# Patient Record
Sex: FEMALE | Race: WHITE | Employment: OTHER | URBAN - METROPOLITAN AREA
[De-identification: names, ages, dates, MRNs, and addresses within clinical notes are randomized per-mention and may not be internally consistent; named-entity substitution may affect disease eponyms.]

---

## 2017-10-06 ENCOUNTER — PATIENT OUTREACH (OUTPATIENT)
Dept: CASE MANAGEMENT | Age: 67
End: 2017-10-06

## 2017-10-06 ENCOUNTER — HOSPITAL ENCOUNTER (OUTPATIENT)
Dept: LAB | Age: 67
Discharge: HOME | End: 2017-10-06
Payer: MEDICARE

## 2017-10-06 DIAGNOSIS — C64.9 MALIGNANT NEOPLASM OF KIDNEY, UNSPECIFIED LATERALITY (HCC): ICD-10-CM

## 2017-10-06 DIAGNOSIS — R91.8 LUNG NODULES: ICD-10-CM

## 2017-10-06 LAB
ALBUMIN SERPL-MCNC: 2.7 G/DL (ref 3.2–4.6)
ALBUMIN/GLOB SERPL: 0.5 {RATIO} (ref 1.2–3.5)
ALP SERPL-CCNC: 158 U/L (ref 50–136)
ALT SERPL-CCNC: 11 U/L (ref 12–65)
ANION GAP SERPL CALC-SCNC: 12 MMOL/L (ref 7–16)
AST SERPL-CCNC: 16 U/L (ref 15–37)
BASOPHILS # BLD: 0 K/UL (ref 0–0.2)
BASOPHILS NFR BLD: 0 % (ref 0–2)
BILIRUB SERPL-MCNC: 0.4 MG/DL (ref 0.2–1.1)
BUN SERPL-MCNC: 28 MG/DL (ref 8–23)
CALCIUM SERPL-MCNC: 9.8 MG/DL (ref 8.3–10.4)
CHLORIDE SERPL-SCNC: 99 MMOL/L (ref 98–107)
CO2 SERPL-SCNC: 24 MMOL/L (ref 21–32)
CREAT SERPL-MCNC: 1.54 MG/DL (ref 0.6–1)
DIFFERENTIAL METHOD BLD: ABNORMAL
EOSINOPHIL # BLD: 0 K/UL (ref 0–0.8)
EOSINOPHIL NFR BLD: 0 % (ref 0.5–7.8)
ERYTHROCYTE [DISTWIDTH] IN BLOOD BY AUTOMATED COUNT: 18.2 % (ref 11.9–14.6)
FERRITIN SERPL-MCNC: 1450 NG/ML (ref 8–388)
GLOBULIN SER CALC-MCNC: 5.3 G/DL (ref 2.3–3.5)
GLUCOSE SERPL-MCNC: 88 MG/DL (ref 65–100)
HCT VFR BLD AUTO: 27.9 % (ref 35.8–46.3)
HGB BLD-MCNC: 8.6 G/DL (ref 11.7–15.4)
IRON SATN MFR SERPL: 15 %
IRON SERPL-MCNC: 30 UG/DL (ref 35–150)
LDH SERPL L TO P-CCNC: 193 U/L (ref 110–210)
LYMPHOCYTES # BLD: 0.9 K/UL (ref 0.5–4.6)
LYMPHOCYTES NFR BLD: 12 % (ref 13–44)
MCH RBC QN AUTO: 25.5 PG (ref 26.1–32.9)
MCHC RBC AUTO-ENTMCNC: 30.8 G/DL (ref 31.4–35)
MCV RBC AUTO: 82.8 FL (ref 79.6–97.8)
MONOCYTES # BLD: 0.5 K/UL (ref 0.1–1.3)
MONOCYTES NFR BLD: 6 % (ref 4–12)
NEUTS SEG # BLD: 5.9 K/UL (ref 1.7–8.2)
NEUTS SEG NFR BLD: 81 % (ref 43–78)
NRBC # BLD: 0 K/UL (ref 0–0.2)
PLATELET # BLD AUTO: 166 K/UL (ref 150–450)
PMV BLD AUTO: 8.5 FL (ref 10.8–14.1)
POTASSIUM SERPL-SCNC: 4.4 MMOL/L (ref 3.5–5.1)
PROT SERPL-MCNC: 8 G/DL (ref 6.3–8.2)
RBC # BLD AUTO: 3.37 M/UL (ref 4.05–5.25)
SODIUM SERPL-SCNC: 135 MMOL/L (ref 136–145)
TIBC SERPL-MCNC: 198 UG/DL (ref 250–450)
WBC # BLD AUTO: 7.2 K/UL (ref 4.3–11.1)

## 2017-10-06 PROCEDURE — 82728 ASSAY OF FERRITIN: CPT | Performed by: INTERNAL MEDICINE

## 2017-10-06 PROCEDURE — 83540 ASSAY OF IRON: CPT | Performed by: INTERNAL MEDICINE

## 2017-10-06 PROCEDURE — 80053 COMPREHEN METABOLIC PANEL: CPT | Performed by: INTERNAL MEDICINE

## 2017-10-06 PROCEDURE — 85025 COMPLETE CBC W/AUTO DIFF WBC: CPT | Performed by: INTERNAL MEDICINE

## 2017-10-06 PROCEDURE — 83615 LACTATE (LD) (LDH) ENZYME: CPT | Performed by: INTERNAL MEDICINE

## 2017-10-06 PROCEDURE — 36415 COLL VENOUS BLD VENIPUNCTURE: CPT | Performed by: INTERNAL MEDICINE

## 2017-10-10 NOTE — PROGRESS NOTES
History of Present Illness:  Ms. Anish Bowman is a 74 y.o. female who presents today for evaluation regarding metastatic renal cell carcinoma. She developed worsening back pain over the past several months, culminating in a CT scan performed in August 2017 which showed a large kidney mass, bilateral lung masses and a right adrenal mass. 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 She was seen at [facility] and recommended to start on palliative therapy with Sutent and gemcitabine. She has started on Sutent, but wished to obtain a second opinion regarding her care, and presents to [facility] for consultation. Main side effect currently is back/left flank pain, she is on Duragesic 50 mcg with PRN Dilaudid 2 mg q4h which is resulting in incomplete pain relief. She is sleeping poorly, her appetite is poor, and she has nausea (controlled by oral antiemetics). She is taking Megace for appetite without much benefit. She has completed 2 weeks of Sutent, is currently on a week off, and will resume therapy on Sunday. She is on 37.5 mg dosing. Copied from new pt progress note. 10/6/17 saw pt today with Dr. Kaylie Pena for second opinion for renal cancer. She has been treated at   [facility]. Currently on sutent cycle 1, on off week now. She is reporting uncontrolled pain. Currently on duragesic 50 and dilaudid 2 mg. Will increase to 75 mcg and dilaudid 4 mg. Very poor appetite and difficulty sleeping. Will stop megace and start remeron. Plan to continue sutent and repeat imaging after cycle 2. Pt is agreeable with this plan. Provided opportunity to ask questions and discussed all. My contact information was provided and I encouraged them to call with any concerns. Navigation will continue to follow.

## 2017-10-17 ENCOUNTER — APPOINTMENT (OUTPATIENT)
Dept: GENERAL RADIOLOGY | Age: 67
DRG: 682 | End: 2017-10-17
Attending: EMERGENCY MEDICINE
Payer: MEDICARE

## 2017-10-17 ENCOUNTER — HOSPITAL ENCOUNTER (INPATIENT)
Age: 67
LOS: 5 days | Discharge: HOSPICE | DRG: 682 | End: 2017-10-22
Attending: EMERGENCY MEDICINE | Admitting: HOSPITALIST
Payer: MEDICARE

## 2017-10-17 ENCOUNTER — APPOINTMENT (OUTPATIENT)
Dept: CT IMAGING | Age: 67
DRG: 682 | End: 2017-10-17
Attending: EMERGENCY MEDICINE
Payer: MEDICARE

## 2017-10-17 ENCOUNTER — APPOINTMENT (OUTPATIENT)
Dept: ULTRASOUND IMAGING | Age: 67
DRG: 682 | End: 2017-10-17
Attending: HOSPITALIST
Payer: MEDICARE

## 2017-10-17 DIAGNOSIS — N17.9 ACUTE KIDNEY INJURY (HCC): Primary | ICD-10-CM

## 2017-10-17 DIAGNOSIS — E87.5 ACUTE HYPERKALEMIA: ICD-10-CM

## 2017-10-17 DIAGNOSIS — C64.9 RENAL CANCER, UNSPECIFIED LATERALITY (HCC): ICD-10-CM

## 2017-10-17 DIAGNOSIS — E86.0 DEHYDRATION: ICD-10-CM

## 2017-10-17 PROBLEM — E46 MALNOURISHED (HCC): Status: ACTIVE | Noted: 2017-10-17

## 2017-10-17 PROBLEM — R62.51 FAILURE TO THRIVE (0-17): Status: ACTIVE | Noted: 2017-10-17

## 2017-10-17 PROBLEM — J18.9 LEFT LOWER LOBE PNEUMONIA: Status: ACTIVE | Noted: 2017-10-17

## 2017-10-17 LAB
ALBUMIN SERPL-MCNC: 2.4 G/DL (ref 3.2–4.6)
ALBUMIN/GLOB SERPL: 0.5 {RATIO} (ref 1.2–3.5)
ALP SERPL-CCNC: 293 U/L (ref 50–136)
ALT SERPL-CCNC: 10 U/L (ref 12–65)
ANION GAP SERPL CALC-SCNC: 11 MMOL/L (ref 7–16)
ANION GAP SERPL CALC-SCNC: 14 MMOL/L (ref 7–16)
AST SERPL-CCNC: 30 U/L (ref 15–37)
BASOPHILS # BLD: 0 K/UL (ref 0–0.2)
BASOPHILS NFR BLD: 0 % (ref 0–2)
BILIRUB SERPL-MCNC: 0.3 MG/DL (ref 0.2–1.1)
BNP SERPL-MCNC: 67 PG/ML
BUN SERPL-MCNC: 77 MG/DL (ref 8–23)
BUN SERPL-MCNC: 77 MG/DL (ref 8–23)
CALCIUM SERPL-MCNC: 8.8 MG/DL (ref 8.3–10.4)
CALCIUM SERPL-MCNC: 9 MG/DL (ref 8.3–10.4)
CHLORIDE SERPL-SCNC: 101 MMOL/L (ref 98–107)
CHLORIDE SERPL-SCNC: 98 MMOL/L (ref 98–107)
CO2 SERPL-SCNC: 22 MMOL/L (ref 21–32)
CO2 SERPL-SCNC: 23 MMOL/L (ref 21–32)
CREAT SERPL-MCNC: 3.79 MG/DL (ref 0.6–1)
CREAT SERPL-MCNC: 3.88 MG/DL (ref 0.6–1)
D DIMER PPP FEU-MCNC: 3.05 UG/ML(FEU)
DIFFERENTIAL METHOD BLD: ABNORMAL
EOSINOPHIL # BLD: 0.1 K/UL (ref 0–0.8)
EOSINOPHIL NFR BLD: 1 % (ref 0.5–7.8)
ERYTHROCYTE [DISTWIDTH] IN BLOOD BY AUTOMATED COUNT: 20.2 % (ref 11.9–14.6)
GLOBULIN SER CALC-MCNC: 4.8 G/DL (ref 2.3–3.5)
GLUCOSE BLD STRIP.AUTO-MCNC: 71 MG/DL (ref 65–100)
GLUCOSE SERPL-MCNC: 72 MG/DL (ref 65–100)
GLUCOSE SERPL-MCNC: 74 MG/DL (ref 65–100)
HCT VFR BLD AUTO: 30.2 % (ref 35.8–46.3)
HGB BLD-MCNC: 9.2 G/DL (ref 11.7–15.4)
IMM GRANULOCYTES # BLD: 0 K/UL (ref 0–0.5)
IMM GRANULOCYTES NFR BLD: 0.2 % (ref 0–5)
LACTATE BLD-SCNC: 2.8 MMOL/L (ref 0.5–1.9)
LYMPHOCYTES # BLD: 0.9 K/UL (ref 0.5–4.6)
LYMPHOCYTES NFR BLD: 21 % (ref 13–44)
MCH RBC QN AUTO: 25.6 PG (ref 26.1–32.9)
MCHC RBC AUTO-ENTMCNC: 30.5 G/DL (ref 31.4–35)
MCV RBC AUTO: 84.1 FL (ref 79.6–97.8)
MONOCYTES # BLD: 0.2 K/UL (ref 0.1–1.3)
MONOCYTES NFR BLD: 4 % (ref 4–12)
NEUTS SEG # BLD: 3.4 K/UL (ref 1.7–8.2)
NEUTS SEG NFR BLD: 74 % (ref 43–78)
PLATELET # BLD AUTO: 139 K/UL (ref 150–450)
PMV BLD AUTO: 9.4 FL (ref 10.8–14.1)
POTASSIUM SERPL-SCNC: 5.9 MMOL/L (ref 3.5–5.1)
POTASSIUM SERPL-SCNC: 6.2 MMOL/L (ref 3.5–5.1)
PROT SERPL-MCNC: 7.2 G/DL (ref 6.3–8.2)
RBC # BLD AUTO: 3.59 M/UL (ref 4.05–5.25)
SODIUM SERPL-SCNC: 134 MMOL/L (ref 136–145)
SODIUM SERPL-SCNC: 135 MMOL/L (ref 136–145)
TROPONIN I BLD-MCNC: 0.01 NG/ML (ref 0.02–0.05)
WBC # BLD AUTO: 4.6 K/UL (ref 4.3–11.1)

## 2017-10-17 PROCEDURE — 74011250636 HC RX REV CODE- 250/636: Performed by: HOSPITALIST

## 2017-10-17 PROCEDURE — 82962 GLUCOSE BLOOD TEST: CPT

## 2017-10-17 PROCEDURE — 74011000250 HC RX REV CODE- 250: Performed by: EMERGENCY MEDICINE

## 2017-10-17 PROCEDURE — 74011000258 HC RX REV CODE- 258: Performed by: HOSPITALIST

## 2017-10-17 PROCEDURE — 84484 ASSAY OF TROPONIN QUANT: CPT

## 2017-10-17 PROCEDURE — 74011636637 HC RX REV CODE- 636/637: Performed by: EMERGENCY MEDICINE

## 2017-10-17 PROCEDURE — 36415 COLL VENOUS BLD VENIPUNCTURE: CPT | Performed by: HOSPITALIST

## 2017-10-17 PROCEDURE — 74011000258 HC RX REV CODE- 258: Performed by: EMERGENCY MEDICINE

## 2017-10-17 PROCEDURE — 74011250636 HC RX REV CODE- 250/636: Performed by: EMERGENCY MEDICINE

## 2017-10-17 PROCEDURE — 96374 THER/PROPH/DIAG INJ IV PUSH: CPT | Performed by: EMERGENCY MEDICINE

## 2017-10-17 PROCEDURE — 83605 ASSAY OF LACTIC ACID: CPT

## 2017-10-17 PROCEDURE — 85379 FIBRIN DEGRADATION QUANT: CPT | Performed by: EMERGENCY MEDICINE

## 2017-10-17 PROCEDURE — 99285 EMERGENCY DEPT VISIT HI MDM: CPT | Performed by: EMERGENCY MEDICINE

## 2017-10-17 PROCEDURE — 77030019605

## 2017-10-17 PROCEDURE — 85025 COMPLETE CBC W/AUTO DIFF WBC: CPT | Performed by: EMERGENCY MEDICINE

## 2017-10-17 PROCEDURE — 74176 CT ABD & PELVIS W/O CONTRAST: CPT

## 2017-10-17 PROCEDURE — 96375 TX/PRO/DX INJ NEW DRUG ADDON: CPT | Performed by: EMERGENCY MEDICINE

## 2017-10-17 PROCEDURE — 80053 COMPREHEN METABOLIC PANEL: CPT | Performed by: EMERGENCY MEDICINE

## 2017-10-17 PROCEDURE — 76770 US EXAM ABDO BACK WALL COMP: CPT

## 2017-10-17 PROCEDURE — 65610000001 HC ROOM ICU GENERAL

## 2017-10-17 PROCEDURE — 83880 ASSAY OF NATRIURETIC PEPTIDE: CPT | Performed by: EMERGENCY MEDICINE

## 2017-10-17 PROCEDURE — 87040 BLOOD CULTURE FOR BACTERIA: CPT | Performed by: EMERGENCY MEDICINE

## 2017-10-17 PROCEDURE — 71010 XR CHEST PORT: CPT

## 2017-10-17 PROCEDURE — 74011250637 HC RX REV CODE- 250/637: Performed by: HOSPITALIST

## 2017-10-17 PROCEDURE — 80048 BASIC METABOLIC PNL TOTAL CA: CPT | Performed by: HOSPITALIST

## 2017-10-17 RX ORDER — ALBUTEROL SULFATE 0.83 MG/ML
2.5 SOLUTION RESPIRATORY (INHALATION)
Status: ACTIVE | OUTPATIENT
Start: 2017-10-17 | End: 2017-10-18

## 2017-10-17 RX ORDER — SODIUM CHLORIDE 9 MG/ML
75 INJECTION, SOLUTION INTRAVENOUS CONTINUOUS
Status: DISPENSED | OUTPATIENT
Start: 2017-10-17 | End: 2017-10-19

## 2017-10-17 RX ORDER — ONDANSETRON 2 MG/ML
4 INJECTION INTRAMUSCULAR; INTRAVENOUS
Status: COMPLETED | OUTPATIENT
Start: 2017-10-17 | End: 2017-10-17

## 2017-10-17 RX ORDER — DEXTROSE 50 % IN WATER (D50W) INTRAVENOUS SYRINGE
50
Status: COMPLETED | OUTPATIENT
Start: 2017-10-17 | End: 2017-10-17

## 2017-10-17 RX ORDER — SODIUM CHLORIDE 0.9 % (FLUSH) 0.9 %
5-10 SYRINGE (ML) INJECTION AS NEEDED
Status: DISCONTINUED | OUTPATIENT
Start: 2017-10-17 | End: 2017-10-17

## 2017-10-17 RX ORDER — ACETAMINOPHEN 325 MG/1
650 TABLET ORAL
Status: DISCONTINUED | OUTPATIENT
Start: 2017-10-17 | End: 2017-10-22 | Stop reason: HOSPADM

## 2017-10-17 RX ORDER — ONDANSETRON 2 MG/ML
4 INJECTION INTRAMUSCULAR; INTRAVENOUS EVERY 4 HOURS
Status: DISCONTINUED | OUTPATIENT
Start: 2017-10-17 | End: 2017-10-22 | Stop reason: HOSPADM

## 2017-10-17 RX ORDER — FENTANYL 75 UG/H
1 PATCH TRANSDERMAL
Status: DISCONTINUED | OUTPATIENT
Start: 2017-10-17 | End: 2017-10-22 | Stop reason: HOSPADM

## 2017-10-17 RX ORDER — INSULIN LISPRO 100 [IU]/ML
INJECTION, SOLUTION INTRAVENOUS; SUBCUTANEOUS
Status: DISCONTINUED | OUTPATIENT
Start: 2017-10-17 | End: 2017-10-22 | Stop reason: HOSPADM

## 2017-10-17 RX ORDER — SODIUM CHLORIDE 0.9 % (FLUSH) 0.9 %
5-10 SYRINGE (ML) INJECTION EVERY 8 HOURS
Status: DISCONTINUED | OUTPATIENT
Start: 2017-10-17 | End: 2017-10-17

## 2017-10-17 RX ORDER — DEXTROSE 50 % IN WATER (D50W) INTRAVENOUS SYRINGE
25 ONCE
Status: DISCONTINUED | OUTPATIENT
Start: 2017-10-17 | End: 2017-10-17

## 2017-10-17 RX ORDER — HYDROMORPHONE HYDROCHLORIDE 1 MG/ML
0.5 INJECTION, SOLUTION INTRAMUSCULAR; INTRAVENOUS; SUBCUTANEOUS
Status: DISCONTINUED | OUTPATIENT
Start: 2017-10-17 | End: 2017-10-22 | Stop reason: HOSPADM

## 2017-10-17 RX ORDER — HYDROMORPHONE HYDROCHLORIDE 1 MG/ML
0.5 INJECTION, SOLUTION INTRAMUSCULAR; INTRAVENOUS; SUBCUTANEOUS
Status: COMPLETED | OUTPATIENT
Start: 2017-10-17 | End: 2017-10-17

## 2017-10-17 RX ORDER — HYDROMORPHONE HYDROCHLORIDE 1 MG/ML
2 INJECTION, SOLUTION INTRAMUSCULAR; INTRAVENOUS; SUBCUTANEOUS
Status: DISCONTINUED | OUTPATIENT
Start: 2017-10-17 | End: 2017-10-17

## 2017-10-17 RX ORDER — SODIUM CHLORIDE 0.9 % (FLUSH) 0.9 %
5-10 SYRINGE (ML) INJECTION EVERY 8 HOURS
Status: DISCONTINUED | OUTPATIENT
Start: 2017-10-17 | End: 2017-10-22 | Stop reason: HOSPADM

## 2017-10-17 RX ORDER — MIRTAZAPINE 15 MG/1
15 TABLET, FILM COATED ORAL
Status: DISCONTINUED | OUTPATIENT
Start: 2017-10-17 | End: 2017-10-22 | Stop reason: HOSPADM

## 2017-10-17 RX ORDER — HEPARIN SODIUM 5000 [USP'U]/ML
5000 INJECTION, SOLUTION INTRAVENOUS; SUBCUTANEOUS EVERY 8 HOURS
Status: DISCONTINUED | OUTPATIENT
Start: 2017-10-17 | End: 2017-10-22 | Stop reason: HOSPADM

## 2017-10-17 RX ORDER — SODIUM CHLORIDE 0.9 % (FLUSH) 0.9 %
5-10 SYRINGE (ML) INJECTION AS NEEDED
Status: DISCONTINUED | OUTPATIENT
Start: 2017-10-17 | End: 2017-10-22 | Stop reason: HOSPADM

## 2017-10-17 RX ORDER — ADHESIVE BANDAGE
30 BANDAGE TOPICAL DAILY PRN
Status: DISCONTINUED | OUTPATIENT
Start: 2017-10-17 | End: 2017-10-22 | Stop reason: HOSPADM

## 2017-10-17 RX ORDER — HYDROCODONE BITARTRATE AND ACETAMINOPHEN 7.5; 325 MG/1; MG/1
1 TABLET ORAL
Status: DISCONTINUED | OUTPATIENT
Start: 2017-10-17 | End: 2017-10-22 | Stop reason: HOSPADM

## 2017-10-17 RX ADMIN — INSULIN HUMAN 10 UNITS: 100 INJECTION, SOLUTION PARENTERAL at 18:01

## 2017-10-17 RX ADMIN — ONDANSETRON 4 MG: 2 INJECTION INTRAMUSCULAR; INTRAVENOUS at 23:48

## 2017-10-17 RX ADMIN — MIRTAZAPINE 15 MG: 15 TABLET, FILM COATED ORAL at 21:36

## 2017-10-17 RX ADMIN — ONDANSETRON 4 MG: 2 INJECTION INTRAMUSCULAR; INTRAVENOUS at 16:47

## 2017-10-17 RX ADMIN — SODIUM CHLORIDE 1000 ML: 900 INJECTION, SOLUTION INTRAVENOUS at 16:47

## 2017-10-17 RX ADMIN — DEXTROSE MONOHYDRATE 25 G: 25 INJECTION, SOLUTION INTRAVENOUS at 18:02

## 2017-10-17 RX ADMIN — HEPARIN SODIUM 5000 UNITS: 5000 INJECTION, SOLUTION INTRAVENOUS; SUBCUTANEOUS at 20:24

## 2017-10-17 RX ADMIN — CALCIUM GLUCONATE 1 G: 94 INJECTION, SOLUTION INTRAVENOUS at 19:08

## 2017-10-17 RX ADMIN — AZITHROMYCIN MONOHYDRATE 500 MG: 500 INJECTION, POWDER, LYOPHILIZED, FOR SOLUTION INTRAVENOUS at 20:24

## 2017-10-17 RX ADMIN — ONDANSETRON 4 MG: 2 INJECTION INTRAMUSCULAR; INTRAVENOUS at 20:24

## 2017-10-17 RX ADMIN — SODIUM CHLORIDE 500 ML: 900 INJECTION, SOLUTION INTRAVENOUS at 22:43

## 2017-10-17 RX ADMIN — SODIUM CHLORIDE 75 ML/HR: 900 INJECTION, SOLUTION INTRAVENOUS at 20:23

## 2017-10-17 RX ADMIN — HYDROMORPHONE HYDROCHLORIDE 0.5 MG: 1 INJECTION, SOLUTION INTRAMUSCULAR; INTRAVENOUS; SUBCUTANEOUS at 16:47

## 2017-10-17 RX ADMIN — Medication 5 ML: at 18:02

## 2017-10-17 RX ADMIN — CEFTRIAXONE 1 G: 1 INJECTION, POWDER, FOR SOLUTION INTRAMUSCULAR; INTRAVENOUS at 20:25

## 2017-10-17 NOTE — H&P
HOSPITALIST H&P/CONSULT  NAME:  Lo Valerio   Age:  74 y.o.  :   1950   MRN:   [id number]  PCP: Leilani Davis MD  Consulting MD:  Treatment Team: Primary Nurse: Roxanne Davidson RN  HPI:   Patient is 74years old female with a recent diagnosis of stage 4 renal cancer, HTN, DM-2, hypothyroidism, chronic back pain brought in for weakness, fatigue, SOB since last couple of days. Pt is following up with [facility], following up with oncologist, Dr. Nimisha Palmer. On review of records, pt has renal cell carcinoma with sarcomatoid features, with bilateral lung masses and right adrenal mass. Currently on Sutent, with plan to add gemcitabine. On this visit, pt is complaining of decreasing urine output over last 3 days, with worsening weakness, poor appetite, nausea or vomiting. She also complains of SOB, with occasional dry cough, intermittent diarrhea. She denies any fever, chills, chest pain, palpitations, hemoptysis, melena, hematochezia. Pt also endorsed significant > 70 pounds weight loss over last 1 year. In ER, creatinine was 3.88 increased from 1.54 (10/), potassium of 6.2, lactic acid of 2.8. CXR showing left hilar mass with occlusion of left bronchus. Complete ROS done and is as stated in HPI or otherwise negative  Past Medical History:   Diagnosis Date    Diabetes ([facility]     Hypertension     Thyroid disease       Past Surgical History:   Procedure Laterality Date    HX BACK SURGERY      HX CYSTOCELE REPAIR      HX HYSTERECTOMY      HX PARATHYROIDECTOMY      HX RECTOCELE REPAIR        Prior to Admission Medications   Prescriptions Last Dose Informant Patient Reported? Taking? HYDROmorphone (DILAUDID) 4 mg tablet   No No   Sig: Take 1 Tab by mouth every three (3) hours as needed for Pain. Max Daily Amount: 32 mg.   fentaNYL (DURAGESIC) 75 mcg/hr   No No   Si Patch by TransDERmal route every seventy-two (72) hours.  Max Daily Amount: 1 Patch.   losartan-hydroCHLOROthiazide (HYZAAR) 50-12.5 mg per tablet   Yes No   Sig: Take 1 Tab by mouth.   metFORMIN (GLUCOPHAGE) 1,000 mg tablet   Yes No   Sig: Take 1,000 mg by mouth.   mirtazapine (REMERON) 15 mg tablet   No No   Sig: Take 1 Tab by mouth nightly. ondansetron hcl (ZOFRAN) 8 mg tablet   Yes No   Sig: Take 8 mg by mouth. Facility-Administered Medications: None     No Known Allergies   Social History   Substance Use Topics    Smoking status: Former Smoker    Smokeless tobacco: Never Used    Alcohol use No      Family History   Problem Relation Age of Onset    Heart Disease Father     Diabetes Other     Hypertension Other       Objective:     Visit Vitals    /58    Pulse 93    Temp 97.8 °F (36.6 °C)    Resp 19    Ht 5' 7\" (1.702 m)    Wt 70.8 kg (156 lb)    SpO2 95%    BMI 24.43 kg/m2      Temp (24hrs), Av.8 °F (36.6 °C), Min:97.8 °F (36.6 °C), Max:97.8 °F (36.6 °C)    Oxygen Therapy  O2 Sat (%): 95 % (10/17/17 1816)  Pulse via Oximetry: 94 beats per minute (10/17/17 1816)  O2 Device: Room air (10/17/17 1632)  Physical Exam:  General:    Cachectic, temporal muscle wasting, mild distress    Head:   Normocephalic, without obvious abnormality, atraumatic. Nose:  Nares normal. No drainage or sinus tenderness. Lungs:   Diminished BS on left side, no wheezing  Heart:   Regular rhythm, tachycardic,  no murmur, rub or gallop. Abdomen:   Distended, non tender, no organomegaly felt, BS +   Extremities: No cyanosis. 1+ edema B/L LE. No clubbing  Skin:     Texture, turgor normal. No rashes or lesions.   Not Jaundiced  Neurologic: GCS 15, no motor or sensory deficits, CN 2-12 intact  Psych:             AO x3, mood and affect flat  Data Review:   Recent Results (from the past 24 hour(s))   CBC WITH AUTOMATED DIFF    Collection Time: 10/17/17  4:30 PM   Result Value Ref Range    WBC 4.6 4.3 - 11.1 K/uL    RBC 3.59 (L) 4.05 - 5.25 M/uL    HGB 9.2 (L) 11.7 - 15.4 g/dL    HCT 30.2 (L) 35.8 - 46.3 %    MCV 84.1 79.6 - 97.8 FL    MCH 25.6 (L) 26.1 - 32.9 PG    MCHC 30.5 (L) 31.4 - 35.0 g/dL    RDW 20.2 (H) 11.9 - 14.6 %    PLATELET 914 (L) 278 - 450 K/uL    MPV 9.4 (L) 10.8 - 14.1 FL    DF AUTOMATED      NEUTROPHILS 74 43 - 78 %    LYMPHOCYTES 21 13 - 44 %    MONOCYTES 4 4.0 - 12.0 %    EOSINOPHILS 1 0.5 - 7.8 %    BASOPHILS 0 0.0 - 2.0 %    IMMATURE GRANULOCYTES 0.2 0.0 - 5.0 %    ABS. NEUTROPHILS 3.4 1.7 - 8.2 K/UL    ABS. LYMPHOCYTES 0.9 0.5 - 4.6 K/UL    ABS. MONOCYTES 0.2 0.1 - 1.3 K/UL    ABS. EOSINOPHILS 0.1 0.0 - 0.8 K/UL    ABS. BASOPHILS 0.0 0.0 - 0.2 K/UL    ABS. IMM. GRANS. 0.0 0.0 - 0.5 K/UL   METABOLIC PANEL, COMPREHENSIVE    Collection Time: 10/17/17  4:30 PM   Result Value Ref Range    Sodium 134 (L) 136 - 145 mmol/L    Potassium 6.2 (HH) 3.5 - 5.1 mmol/L    Chloride 98 98 - 107 mmol/L    CO2 22 21 - 32 mmol/L    Anion gap 14 7 - 16 mmol/L    Glucose 72 65 - 100 mg/dL    BUN 77 (H) 8 - 23 MG/DL    Creatinine 3.88 (H) 0.6 - 1.0 MG/DL    GFR est AA 15 (L) >60 ml/min/1.73m2    GFR est non-AA 12 (L) >60 ml/min/1.73m2    Calcium 9.0 8.3 - 10.4 MG/DL    Bilirubin, total 0.3 0.2 - 1.1 MG/DL    ALT (SGPT) 10 (L) 12 - 65 U/L    AST (SGOT) 30 15 - 37 U/L    Alk. phosphatase 293 (H) 50 - 136 U/L    Protein, total 7.2 6.3 - 8.2 g/dL    Albumin 2.4 (L) 3.2 - 4.6 g/dL    Globulin 4.8 (H) 2.3 - 3.5 g/dL    A-G Ratio 0.5 (L) 1.2 - 3.5     BNP    Collection Time: 10/17/17  4:30 PM   Result Value Ref Range    BNP 67 pg/mL   POC LACTIC ACID    Collection Time: 10/17/17  4:37 PM   Result Value Ref Range    Lactic Acid (POC) 2.8 (H) 0.5 - 1.9 mmol/L   POC TROPONIN-I    Collection Time: 10/17/17  4:37 PM   Result Value Ref Range    Troponin-I (POC) 0.01 (L) 0.02 - 0.05 ng/ml     Imaging /Procedures /Studies   Portable chest x-ray     INDICATION: Worsening shortness of breath     FINDINGS: No prior studies for comparison. There is a left perihilar mass with  asymmetric elevation of the left diaphragm with left basal atelectasis.  Heart  size is normal. No pulmonary edema or pleural effusion.     IMPRESSION  IMPRESSION: Left hilar lung mass probably resultant atelectasis in the left  lower lobe from airway obstruction. Assessment and Plan: Active Hospital Problems    Diagnosis Date Noted    Hyperkalemia 10/17/2017    Malnourished ([address] 10/17/2017    Failure to thrive (0-17) 10/17/2017    Acute renal failure (ARF) ([address] 10/17/2017    Left lower lobe pneumonia ([address] 10/17/2017    Renal cancer, unspecified laterality ([address] 10/17/2017       PLAN  · Admit to telemetry in view of hyperkalemia with oliguric renal failure, left lower lobe pneumonia and stage 4 metastatic renal cancer. · Blood culture and urine culture sent. · Empiric IV rocephin and azithro for possible Left LL PNA. · Urine electrolytes, with close monitoring of urine output. Nephrology consulted. Will CT abdomen/pelvis w/o contrast.   · DVT prophylaxis with heparin. · Pulmonary consulted in view of CXR, left bronchus obstruction. · I had an extensive discussion with pt, her  and daughter about extreme poor prognosis. They want the patient to be comfortable, and would like to speak to hospice team in AM.  · Opioid pain meds as needed. · Failure to thrive: dietician consulted, will continue remeron and add megace. · POC monitoring qachs, SSI.     Code Status: partial    Anticipated discharge: >2-3 MN    Signed By: Hernesto Jackson MD     October 17, 2017

## 2017-10-17 NOTE — IP AVS SNAPSHOT
[address] 
[phone] Patient: Pedrito Estevez MRN: [id number] MATT:[birth year] You are allergic to the following No active allergies Immunizations Administered for This Admission Name Date Influenza Vaccine (Quad) PF  Deferred () Recent Documentation Height Weight BMI OB Status Smoking Status 1.702 m 83.1 kg 28.68 kg/m2 Hysterectomy Former Smoker Emergency Contacts Name Discharge Info Relation Home Work Mobile Demetrius Ellsworth  Spouse [3] [phone] About your hospitalization You were admitted on:  October 17, 2017 You last received care in the:  [address] You were discharged on:  October 22, 2017 Unit phone number:  [phone] Why you were hospitalized Your primary diagnosis was:  Acute Renal Failure (Arf) (Hcc) Your diagnoses also included:  Hyperkalemia, Malnourished (Hcc), Failure To Thrive (0-17), Left Lower Lobe Pneumonia (Hcc), Renal Cancer, Unspecified Laterality (Hcc) Providers Seen During Your Hospitalizations Provider Role Specialty Primary office phone Gi Lujan MD Attending Provider Emergency Medicine [phone] Severo Dark, MD Attending Provider Internal Medicine [phone] Your Primary Care Physician (PCP) Primary Care Physician Office Phone Office Fax OTHER, PHYS ** None ** ** None ** Follow-up Information Follow up With Details Comments Contact Info Jana Josue MD   Patient can only remember the practice name and not the physician Your Appointments Friday October 27, 2017  7:30 AM EDT  
LAB with [address]  
1808 [facility] [place] [facility] 426 187 [facility]  
[phone] Friday October 27, 2017  8:00 AM EDT Follow Up with Bakari Douglas MD  
 [facility]) JOSE/ Shreyas Mccann [address] [facility] [id number] [phone] Current Discharge Medication List  
  
START taking these medications Dose & Instructions Dispensing Information Comments Morning Noon Evening Bedtime  
 acetaminophen 325 mg tablet Commonly known as:  TYLENOL Your last dose was: Your next dose is:    
   
   
 Dose:  650 mg Take 2 Tabs by mouth every six (6) hours as needed. Quantity:  30 Tab Refills:  0  
     
   
   
   
  
 alum-mag hydroxide-simeth 200-200-20 mg/5 mL Susp Commonly known as:  MYLANTA Your last dose was: Your next dose is:    
   
   
 Dose:  30 mL Take 30 mL by mouth three (3) times daily (with meals). Quantity:  1 Bottle Refills:  0 HYDROcodone-acetaminophen  mg tablet Commonly known as:  Yane Dumas Your last dose was: Your next dose is:    
   
   
 Dose:  1 Tab Take 1 Tab by mouth every six (6) hours as needed for Pain. Max Daily Amount: 4 Tabs. Quantity:  30 Tab Refills:  0  
     
   
   
   
  
 hydrocortisone 1 % rectal cream  
Commonly known as:  PROCTO-TRUNG Your last dose was: Your next dose is: Insert  into rectum as needed for Hemorrhoids. Quantity:  30 g Refills:  0  
     
   
   
   
  
 insulin lispro 100 unit/mL injection Commonly known as:  HUMALOG Your last dose was: Your next dose is: As per sliding scale protocol: < 150 give 0 units   150- 199 mg/dl: give 2 units   200 - 249 mg/dl : give 4 units  250 - 299 mg/dl: give 6 units 300- 349 mg: give 8 units  > 350 mg/dl 10 units Quantity:  1 Vial  
Refills:  2  
     
   
   
   
  
 levoFLOXacin 750 mg tablet Commonly known as:  Boris Aloe Start taking on:  10/23/2017 Your last dose was:     
   
Your next dose is:    
   
   
 Dose:  750 mg  
 Take 1 Tab by mouth every fourty-eight (48) hours. Quantity:  3 Tab Refills:  0  
     
   
   
   
  
 ondansetron 4 mg disintegrating tablet Commonly known as:  ZOFRAN ODT Your last dose was: Your next dose is:    
   
   
 Dose:  4 mg Take 1 Tab by mouth every eight (8) hours as needed for Nausea. Quantity:  20 Tab Refills:  0 CONTINUE these medications which have NOT CHANGED Dose & Instructions Dispensing Information Comments Morning Noon Evening Bedtime  
 fentaNYL 75 mcg/hr Commonly known as:  Serenity Obrien Your last dose was: Your next dose is:    
   
   
 Dose:  1 Patch 1 Patch by TransDERmal route every seventy-two (72) hours. Max Daily Amount: 1 Patch. Quantity:  10 Patch Refills:  0 STOP taking these medications HYDROmorphone 4 mg tablet Commonly known as:  DILAUDID  
   
  
 losartan-hydroCHLOROthiazide 50-12.5 mg per tablet Commonly known as:  HYZAAR  
   
  
 metFORMIN 1,000 mg tablet Commonly known as:  GLUCOPHAGE  
   
  
 mirtazapine 15 mg tablet Commonly known as:  REMERON  
   
  
 ondansetron hcl 8 mg tablet Commonly known as:  Mason Torres Where to Get Your Medications These medications were sent to [place] [address], [facility] [address]  [address], [address] [place] [address] 3500 [facility] [facility] Phone:  [phone]  
  hydrocortisone 1 % rectal cream  
  
  
Information on where to get these meds will be given to you by the nurse or doctor. ! Ask your nurse or doctor about these medications  
  acetaminophen 325 mg tablet  
 alum-mag hydroxide-simeth 200-200-20 mg/5 mL Susp  
 fentaNYL 75 mcg/hr HYDROcodone-acetaminophen  mg tablet  
 insulin lispro 100 unit/mL injection  
 levoFLOXacin 750 mg tablet  
 ondansetron 4 mg disintegrating tablet Discharge Instructions None Discharge Orders None [facility] Announcement We are excited to announce that we are making your provider's discharge notes available to you in [employer]. You will see these notes when they are completed and signed by the physician that discharged you from your recent hospital stay. If you have any questions or concerns about any information you see in [employer]ptDeco, please call the Health Information Department where you were seen or reach out to your Primary Care Provider for more information about your plan of care. Introducing [facility]! Tamica Brown introduces AptDeco patient portal. Now you can access parts of your medical record, email your doctor's office, and request medication refills online. 1. In your internet browser, go to [email or web address] [email or web address] 2. Click on the First Time User? Click Here link in the Sign In box. You will see the New Member Sign Up page. 3. Enter your [employer] Access Code exactly as it appears below. You will not need to use this code after youve completed the sign-up process. If you do not sign up before the expiration date, you must request a new code. · [employer]pt[employer] Access Code: [email or web address] Expires: 1/4/2018  3:58 PM 
 
4. Enter the last four digits of your Social Security Number (xxxx) and Date of Birth (mm/dd/yyyy) as indicated and click Submit. You will be taken to the next sign-up page. 5. Create a AptDeco ID. This will be your AptDeco login ID and cannot be changed, so think of one that is secure and easy to remember. 6. Create a [employer]pt[employer] password. You can change your password at any time. 7. Enter your Password Reset Question and Answer. This can be used at a later time if you forget your password. 8. Enter your e-mail address. You will receive e-mail notification when new information is available in [address] 9. Click Sign Up. You can now view and download portions of your medical record.  
10. Click the Download Summary menu link to download a portable copy of your medical information. If you have questions, please visit the Frequently Asked Questions section of the [employer] website. Remember, MyChart is NOT to be used for urgent needs. For medical emergencies, dial 911. Now available from your iPhone and Android! General Information Please provide this summary of care documentation to your next provider. Patient Signature:  ____________________________________________________________ Date:  ____________________________________________________________  
  
[facility] Provider Signature:  ____________________________________________________________ Date:  ____________________________________________________________

## 2017-10-17 NOTE — ED NOTES
TRANSFER - OUT REPORT:    Verbal report given to JESSICA Matthews on Shreyas Mari  being transferred to [facility] 25[address] 1709 for routine progression of care       Report consisted of patients Situation, Background, Assessment and   Recommendations(SBAR). Information from the following report(s) SBAR, ED Summary, Intake/Output, MAR and Cardiac Rhythm NSR was reviewed with the receiving nurse. Lines:   Peripheral IV 10/17/17 Right Antecubital (Active)   Site Assessment Clean, dry, & intact 10/17/2017  4:32 PM   Phlebitis Assessment 0 10/17/2017  4:32 PM   Infiltration Assessment 0 10/17/2017  4:32 PM   Dressing Status Clean, dry, & intact 10/17/2017  4:32 PM   Alcohol Cap Used No 10/17/2017  4:32 PM        Opportunity for questions and clarification was provided. Patient transported with:   Registered Nurse    VTE prophylaxis orders have not been written for Shreyas Mari. Patient and family given floor number and nurses name. Family updated re: pt status after security code verified.

## 2017-10-17 NOTE — IP AVS SNAPSHOT
Summary of Care Report The Summary of Care report has been created to help improve care coordination. Users with access to [employer] or [employer] [facility] (Web-based application) may access additional patient information including the Discharge Summary. If you are not currently a [address] user and need more information, please call the number listed below in the [address] section and ask to be connected with Medical Records. Facility Information Name Address Phone [address] [place] [id number] [phone] Patient Information Patient Name Sex KASSIDY Lacy ([id number]) Female 1950 Discharge Information Admitting Provider Service Area Unit Adina Linares MD / [place] [address] Med Surg / [phone] Discharge Provider Discharge Date/Time Discharge Disposition Destination (none) 10/22/2017 (Pending) AHR (none) Patient Language Language ENGLISH [13] Hospital Problems as of 10/22/2017  Reviewed: 10/6/2017  3:19 PM by Julianna Wilkinson MD  
  
  
  
 Class Noted - Resolved Last Modified POA Active Problems Hyperkalemia  10/17/2017 - Present 10/18/2017 by Claudia Mauricio MD Yes Entered by Adina Linares MD  
  Malnourished [facility])  10/17/2017 - Present 10/18/2017 by Claudia Mauricio MD Yes Entered by Adina Linares MD  
  Failure to thrive (0-17)  10/17/2017 - Present 10/18/2017 by Claudia Mauricio MD Yes Entered by Adina Linares MD  
  * (Principal)Acute renal failure (ARF) ([facility]  10/17/2017 - Present 10/18/2017 by Claudia Mauricio MD Yes Entered by Adina Linares MD  
  Left lower lobe pneumonia ([facility]  10/17/2017 - Present 10/18/2017 by Claudia Mauricio MD Yes   Entered by Adina Linares MD  
  Renal cancer, unspecified laterality ([facility]  10/17/2017 - Present 10/18/2017 by Denis Gandhi MD Yes Entered by Morenita Rivero MD  
  
Non-Hospital Problems as of 10/22/2017  Reviewed: 10/6/2017  3:19 PM by Orson Primrose, MD  
 None You are allergic to the following No active allergies Current Discharge Medication List  
  
START taking these medications Dose & Instructions Dispensing Information Comments  
 acetaminophen 325 mg tablet Commonly known as:  TYLENOL Dose:  650 mg Take 2 Tabs by mouth every six (6) hours as needed. Quantity:  30 Tab Refills:  0  
   
 alum-mag hydroxide-simeth 200-200-20 mg/5 mL Susp Commonly known as:  MYLANTA Dose:  30 mL Take 30 mL by mouth three (3) times daily (with meals). Quantity:  1 Bottle Refills:  0 HYDROcodone-acetaminophen  mg tablet Commonly known as:  Keyana Damian Dose:  1 Tab Take 1 Tab by mouth every six (6) hours as needed for Pain. Max Daily Amount: 4 Tabs. Quantity:  30 Tab Refills:  0  
   
 hydrocortisone 1 % rectal cream  
Commonly known as:  PROCTO-TRUNG Insert  into rectum as needed for Hemorrhoids. Quantity:  30 g Refills:  0  
   
 insulin lispro 100 unit/mL injection Commonly known as:  HUMALOG As per sliding scale protocol: < 150 give 0 units   150- 199 mg/dl: give 2 units   200 - 249 mg/dl : give 4 units  250 - 299 mg/dl: give 6 units 300- 349 mg: give 8 units  > 350 mg/dl 10 units Quantity:  1 Vial  
Refills:  2  
   
 levoFLOXacin 750 mg tablet Commonly known as:  Lonnell Claude Start taking on:  10/23/2017 Dose:  750 mg Take 1 Tab by mouth every fourty-eight (48) hours. Quantity:  3 Tab Refills:  0  
   
 ondansetron 4 mg disintegrating tablet Commonly known as:  ZOFRAN ODT Dose:  4 mg Take 1 Tab by mouth every eight (8) hours as needed for Nausea. Quantity:  20 Tab Refills:  0 CONTINUE these medications which have NOT CHANGED Dose & Instructions Dispensing Information Comments fentaNYL 75 mcg/hr Commonly known as:  Arturo Gerald Dose:  1 Patch 1 Patch by TransDERmal route every seventy-two (72) hours. Max Daily Amount: 1 Patch. Quantity:  10 Patch Refills:  0 STOP taking these medications Comments HYDROmorphone 4 mg tablet Commonly known as:  DILAUDID  
   
   
 losartan-hydroCHLOROthiazide 50-12.5 mg per tablet Commonly known as:  HYZAAR  
   
   
 metFORMIN 1,000 mg tablet Commonly known as:  GLUCOPHAGE  
   
   
 mirtazapine 15 mg tablet Commonly known as:  REMERON  
   
   
 ondansetron hcl 8 mg tablet Commonly known as:  Clarke Mater Current Immunizations Name Date Influenza Vaccine (Quad) PF  Deferred () Follow-up Information Follow up With Details Comments Contact Info Phys Other, MD   Patient can only remember the practice name and not the physician Discharge Instructions None Chart Review Routing History No Routing History on File

## 2017-10-17 NOTE — ED TRIAGE NOTES
Oncologist-Dr Bethanie Klinefelter. Pt c/o shortness of breath and back pain. She states that she became short of breath while sitting in her recliner at home. States that she uses Fentanyl patches but the one she had on got tangled up in her bra yesterday and it had to be taken off. She is not due for another patch until tomorrow.

## 2017-10-17 NOTE — IP AVS SNAPSHOT
[facility] [address] [facility] 
[phone] Patient: Lorraine Marques MRN: [id number] Current Discharge Medication List  
  
START taking these medications Dose & Instructions Dispensing Information Comments Morning Noon Evening Bedtime  
 acetaminophen 325 mg tablet Commonly known as:  TYLENOL Your last dose was: Your next dose is:    
   
   
 Dose:  650 mg Take 2 Tabs by mouth every six (6) hours as needed. Quantity:  30 Tab Refills:  0  
     
   
   
   
  
 alum-mag hydroxide-simeth 200-200-20 mg/5 mL Susp Commonly known as:  MYLANTA Your last dose was: Your next dose is:    
   
   
 Dose:  30 mL Take 30 mL by mouth three (3) times daily (with meals). Quantity:  1 Bottle Refills:  0 HYDROcodone-acetaminophen  mg tablet Commonly known as:  Jannette Jackson Your last dose was: Your next dose is:    
   
   
 Dose:  1 Tab Take 1 Tab by mouth every six (6) hours as needed for Pain. Max Daily Amount: 4 Tabs. Quantity:  30 Tab Refills:  0  
     
   
   
   
  
 hydrocortisone 1 % rectal cream  
Commonly known as:  PROCTO-TRUNG Your last dose was: Your next dose is: Insert  into rectum as needed for Hemorrhoids. Quantity:  30 g Refills:  0  
     
   
   
   
  
 insulin lispro 100 unit/mL injection Commonly known as:  HUMALOG Your last dose was: Your next dose is: As per sliding scale protocol: < 150 give 0 units   150- 199 mg/dl: give 2 units   200 - 249 mg/dl : give 4 units  250 - 299 mg/dl: give 6 units 300- 349 mg: give 8 units  > 350 mg/dl 10 units Quantity:  1 Vial  
Refills:  2  
     
   
   
   
  
 levoFLOXacin 750 mg tablet Commonly known as:  Alyce Mirza Start taking on:  10/23/2017 Your last dose was:     
   
Your next dose is:    
   
   
 Dose:  750 mg  
 Take 1 Tab by mouth every fourty-eight (48) hours. Quantity:  3 Tab Refills:  0  
     
   
   
   
  
 ondansetron 4 mg disintegrating tablet Commonly known as:  ZOFRAN ODT Your last dose was: Your next dose is:    
   
   
 Dose:  4 mg Take 1 Tab by mouth every eight (8) hours as needed for Nausea. Quantity:  20 Tab Refills:  0 CONTINUE these medications which have NOT CHANGED Dose & Instructions Dispensing Information Comments Morning Noon Evening Bedtime  
 fentaNYL 75 mcg/hr Commonly known as:  Shania Mckeon Your last dose was: Your next dose is:    
   
   
 Dose:  1 Patch 1 Patch by TransDERmal route every seventy-two (72) hours. Max Daily Amount: 1 Patch. Quantity:  10 Patch Refills:  0 STOP taking these medications HYDROmorphone 4 mg tablet Commonly known as:  DILAUDID  
   
  
 losartan-hydroCHLOROthiazide 50-12.5 mg per tablet Commonly known as:  HYZAAR  
   
  
 metFORMIN 1,000 mg tablet Commonly known as:  GLUCOPHAGE  
   
  
 mirtazapine 15 mg tablet Commonly known as:  REMERON  
   
  
 ondansetron hcl 8 mg tablet Commonly known as:  Destiny Chilel [facility] to [facility] Your Medications [facility]  [address], [address] [place] [address] 3500 [facility] [facility] Phone:  [phone]  
  hydrocortisone 1 % rectal cream  
  
  
Information on where to get these meds will be given to you by the nurse or doctor. ! Ask your nurse or doctor about these medications  
  acetaminophen 325 mg tablet  
 alum-mag hydroxide-simeth 200-200-20 mg/5 mL Susp  
 fentaNYL 75 mcg/hr HYDROcodone-acetaminophen  mg tablet  
 insulin lispro 100 unit/mL injection  
 levoFLOXacin 750 mg tablet  
 ondansetron 4 mg disintegrating tablet

## 2017-10-17 NOTE — ED PROVIDER NOTES
HPI Comments: 72-year-old female currently undergoing chemotherapy for renal tumor that evidently has metastasized to bone into long. Currently on Sutent. Started out with shortness of breath this morning. She is really not had any fever or cough. She feels weak all over. Has noticed some swelling in her legs. Has some trouble long-term back pain due to metastases. She's had some trouble vomiting and diarrhea on and off for the last 6 months. Poor by mouth intake and appetite over the last few days with much gagging. No dysuria no chest pain. Patient is a 74 y.o. female presenting with shortness of breath. The history is provided by the patient and the spouse. Shortness of Breath   This is a new problem. The problem occurs continuously. The current episode started 6 to 12 hours ago. The problem has not changed since onset. Associated symptoms include vomiting and leg swelling. Pertinent negatives include no fever, no headaches, no rhinorrhea, no neck pain, no cough, no sputum production, no hemoptysis, no wheezing, no orthopnea, no chest pain, no abdominal pain, no rash and no leg pain. Associated medical issues do not include asthma, COPD or PE. Past Medical History:   Diagnosis Date    Diabetes ([address]     Hypertension     Thyroid disease        Past Surgical History:   Procedure Laterality Date    HX BACK SURGERY      HX CYSTOCELE REPAIR      HX HYSTERECTOMY      HX PARATHYROIDECTOMY      HX RECTOCELE REPAIR           Family History:   Problem Relation Age of Onset    Heart Disease Father     Diabetes Other     Hypertension Other        Social History     Social History    Marital status:      Spouse name: N/A    Number of children: N/A    Years of education: N/A     Occupational History    Not on file.      Social History Main Topics    Smoking status: Former Smoker    Smokeless tobacco: Never Used    Alcohol use No    Drug use: Not on file    Sexual activity: Not on file     Other Topics Concern    Not on file     Social History Narrative    No narrative on file         ALLERGIES: Review of patient's allergies indicates no known allergies. Review of Systems   Constitutional: Negative for chills and fever. HENT: Negative for rhinorrhea. Respiratory: Positive for shortness of breath. Negative for cough, hemoptysis, sputum production and wheezing. Cardiovascular: Positive for leg swelling. Negative for chest pain, palpitations and orthopnea. Gastrointestinal: Positive for diarrhea, nausea and vomiting. Negative for abdominal pain. Genitourinary: Negative for dysuria and flank pain. Musculoskeletal: Negative for back pain and neck pain. Skin: Negative for color change and rash. Neurological: Negative for syncope and headaches. All other systems reviewed and are negative. Vitals:    10/17/17 1545 10/17/17 1547   BP: (!) 81/48 (!) 77/47   Pulse: (!) 102    Resp: 18    Temp: 97.8 °F (36.6 °C)    SpO2: 99%    Weight: 70.8 kg (156 lb)    Height: 5' 7\" (1.702 m)             Physical Exam   Constitutional: She is oriented to person, place, and time. She appears well-developed and well-nourished. No distress. HENT:   Head: Normocephalic and atraumatic. Mouth/Throat: Oropharynx is clear and moist. No oropharyngeal exudate. Eyes: Conjunctivae and EOM are normal. Pupils are equal, round, and reactive to light. Neck: Normal range of motion. Neck supple. Cardiovascular: Normal rate, regular rhythm and intact distal pulses. No murmur heard. Pulmonary/Chest: No respiratory distress. She has decreased breath sounds in the left middle field and the left lower field. She has rhonchi. She has no rales. Abdominal: Soft. Bowel sounds are normal. She exhibits no mass. There is no tenderness. There is no rebound and no guarding. No hernia. Neurological: She is alert and oriented to person, place, and time.  Gait normal.   Nl speech   Skin: Skin is warm and dry.   Psychiatric: She has a normal mood and affect. Her speech is normal.   Nursing note and vitals reviewed. MDM  Number of Diagnoses or Management Options  Diagnosis management comments: Pulmonary embolism, pleural effusion, pneumonia, anemia, dehydration. Also possibly congestive heart failure and deconditioning from the patient's illness. Her blood pressure vacillated between 7593 systolic. We'll give IV fluids. Check for sepsis. Amount and/or Complexity of Data Reviewed  Clinical lab tests: ordered and reviewed  Tests in the radiology section of CPT®: ordered and reviewed  Tests in the medicine section of CPT®: ordered and reviewed  Independent visualization of images, tracings, or specimens: yes    Risk of Complications, Morbidity, and/or Mortality  Presenting problems: moderate  Diagnostic procedures: low  Management options: moderate  General comments: EKG shows normal sinus rhythm and no ST-T changes. Patient Progress  Patient progress: stable    ED Course       Procedures         suspect elevated lactate due to dehydration and acute kidney injury. No obvious evidence for sepsis. Patient's blood pressure improved with fluid bolus. She is receiving calcium and insulin/glucose for her hyperkalemia. Specimen was not hemolyzed. I spoke with hospitalist regarding admission. He did desire a CT of the abdomen, noncontrasted to assess for any ureteral obstruction.

## 2017-10-18 ENCOUNTER — HOSPICE ADMISSION (OUTPATIENT)
Dept: HOSPICE | Facility: HOSPICE | Age: 67
End: 2017-10-18

## 2017-10-18 LAB
ALBUMIN SERPL-MCNC: 1.9 G/DL (ref 3.2–4.6)
ALBUMIN/GLOB SERPL: 0.5 {RATIO} (ref 1.2–3.5)
ALP SERPL-CCNC: 231 U/L (ref 50–136)
ALT SERPL-CCNC: 8 U/L (ref 12–65)
AMORPH CRY URNS QL MICRO: ABNORMAL
ANION GAP SERPL CALC-SCNC: 14 MMOL/L (ref 7–16)
ANION GAP SERPL CALC-SCNC: 14 MMOL/L (ref 7–16)
APPEARANCE UR: ABNORMAL
AST SERPL-CCNC: 26 U/L (ref 15–37)
BACTERIA URNS QL MICRO: ABNORMAL /HPF
BILIRUB SERPL-MCNC: 0.2 MG/DL (ref 0.2–1.1)
BILIRUB UR QL: ABNORMAL
BUN SERPL-MCNC: 73 MG/DL (ref 8–23)
BUN SERPL-MCNC: 78 MG/DL (ref 8–23)
CALCIUM SERPL-MCNC: 8 MG/DL (ref 8.3–10.4)
CALCIUM SERPL-MCNC: 8 MG/DL (ref 8.3–10.4)
CASTS URNS QL MICRO: ABNORMAL /LPF
CHLORIDE SERPL-SCNC: 104 MMOL/L (ref 98–107)
CHLORIDE SERPL-SCNC: 105 MMOL/L (ref 98–107)
CK SERPL-CCNC: 18 U/L (ref 21–215)
CO2 SERPL-SCNC: 19 MMOL/L (ref 21–32)
CO2 SERPL-SCNC: 20 MMOL/L (ref 21–32)
COLOR UR: ABNORMAL
CORTIS AM PEAK SERPL-MCNC: 24.8 UG/DL (ref 7–25)
CREAT SERPL-MCNC: 3.42 MG/DL (ref 0.6–1)
CREAT SERPL-MCNC: 3.65 MG/DL (ref 0.6–1)
CREAT UR-MCNC: 198.35 MG/DL
EOSINOPHIL #/AREA URNS HPF: NEGATIVE /[HPF]
EPI CELLS #/AREA URNS HPF: ABNORMAL /HPF
ERYTHROCYTE [DISTWIDTH] IN BLOOD BY AUTOMATED COUNT: 20.4 % (ref 11.9–14.6)
GLOBULIN SER CALC-MCNC: 4 G/DL (ref 2.3–3.5)
GLUCOSE BLD STRIP.AUTO-MCNC: 64 MG/DL (ref 65–100)
GLUCOSE BLD STRIP.AUTO-MCNC: 66 MG/DL (ref 65–100)
GLUCOSE BLD STRIP.AUTO-MCNC: 72 MG/DL (ref 65–100)
GLUCOSE BLD STRIP.AUTO-MCNC: 72 MG/DL (ref 65–100)
GLUCOSE BLD STRIP.AUTO-MCNC: 87 MG/DL (ref 65–100)
GLUCOSE BLD STRIP.AUTO-MCNC: 87 MG/DL (ref 65–100)
GLUCOSE BLD STRIP.AUTO-MCNC: 97 MG/DL (ref 65–100)
GLUCOSE SERPL-MCNC: 64 MG/DL (ref 65–100)
GLUCOSE SERPL-MCNC: 84 MG/DL (ref 65–100)
GLUCOSE UR STRIP.AUTO-MCNC: NEGATIVE MG/DL
HCT VFR BLD AUTO: 26.1 % (ref 35.8–46.3)
HGB BLD-MCNC: 7.7 G/DL (ref 11.7–15.4)
HGB UR QL STRIP: NEGATIVE
KETONES UR QL STRIP.AUTO: ABNORMAL MG/DL
LEUKOCYTE ESTERASE UR QL STRIP.AUTO: NEGATIVE
MAGNESIUM SERPL-MCNC: 1.8 MG/DL (ref 1.8–2.4)
MCH RBC QN AUTO: 24.9 PG (ref 26.1–32.9)
MCHC RBC AUTO-ENTMCNC: 29.5 G/DL (ref 31.4–35)
MCV RBC AUTO: 84.5 FL (ref 79.6–97.8)
NITRITE UR QL STRIP.AUTO: NEGATIVE
PH UR STRIP: 5 [PH] (ref 5–9)
PLATELET # BLD AUTO: 122 K/UL (ref 150–450)
PMV BLD AUTO: 9.3 FL (ref 10.8–14.1)
POTASSIUM SERPL-SCNC: 5.2 MMOL/L (ref 3.5–5.1)
POTASSIUM SERPL-SCNC: 5.8 MMOL/L (ref 3.5–5.1)
POTASSIUM UR-SCNC: 69 MMOL/L
PROCALCITONIN SERPL-MCNC: 8.2 NG/ML
PROT SERPL-MCNC: 5.9 G/DL (ref 6.3–8.2)
PROT UR STRIP-MCNC: 30 MG/DL
PROT UR-MCNC: 111 MG/DL
PROT/CREAT UR-RTO: 0.6
RBC # BLD AUTO: 3.09 M/UL (ref 4.05–5.25)
RBC #/AREA URNS HPF: ABNORMAL /HPF
SODIUM SERPL-SCNC: 138 MMOL/L (ref 136–145)
SODIUM SERPL-SCNC: 138 MMOL/L (ref 136–145)
SODIUM UR-SCNC: 20 MMOL/L
SP GR UR REFRACTOMETRY: 1.02 (ref 1–1.02)
TSH SERPL DL<=0.005 MIU/L-ACNC: 6.32 UIU/ML (ref 0.36–3.74)
UROBILINOGEN UR QL STRIP.AUTO: 0.2 EU/DL (ref 0.2–1)
WBC # BLD AUTO: 4.2 K/UL (ref 4.3–11.1)
WBC URNS QL MICRO: ABNORMAL /HPF

## 2017-10-18 PROCEDURE — 94640 AIRWAY INHALATION TREATMENT: CPT

## 2017-10-18 PROCEDURE — 74011250636 HC RX REV CODE- 250/636: Performed by: HOSPITALIST

## 2017-10-18 PROCEDURE — 76937 US GUIDE VASCULAR ACCESS: CPT

## 2017-10-18 PROCEDURE — 74011250637 HC RX REV CODE- 250/637: Performed by: INTERNAL MEDICINE

## 2017-10-18 PROCEDURE — 80053 COMPREHEN METABOLIC PANEL: CPT | Performed by: HOSPITALIST

## 2017-10-18 PROCEDURE — 77030019605

## 2017-10-18 PROCEDURE — C1751 CATH, INF, PER/CENT/MIDLINE: HCPCS

## 2017-10-18 PROCEDURE — 81001 URINALYSIS AUTO W/SCOPE: CPT | Performed by: HOSPITALIST

## 2017-10-18 PROCEDURE — 87205 SMEAR GRAM STAIN: CPT | Performed by: HOSPITALIST

## 2017-10-18 PROCEDURE — 74011000250 HC RX REV CODE- 250: Performed by: HOSPITALIST

## 2017-10-18 PROCEDURE — 74011000250 HC RX REV CODE- 250: Performed by: INTERNAL MEDICINE

## 2017-10-18 PROCEDURE — 84156 ASSAY OF PROTEIN URINE: CPT | Performed by: HOSPITALIST

## 2017-10-18 PROCEDURE — 84133 ASSAY OF URINE POTASSIUM: CPT | Performed by: HOSPITALIST

## 2017-10-18 PROCEDURE — 82962 GLUCOSE BLOOD TEST: CPT

## 2017-10-18 PROCEDURE — 77010033678 HC OXYGEN DAILY

## 2017-10-18 PROCEDURE — 85027 COMPLETE CBC AUTOMATED: CPT | Performed by: HOSPITALIST

## 2017-10-18 PROCEDURE — 65610000001 HC ROOM ICU GENERAL

## 2017-10-18 PROCEDURE — 02HV33Z INSERTION OF INFUSION DEVICE INTO SUPERIOR VENA CAVA, PERCUTANEOUS APPROACH: ICD-10-PCS | Performed by: INTERNAL MEDICINE

## 2017-10-18 PROCEDURE — 83735 ASSAY OF MAGNESIUM: CPT | Performed by: HOSPITALIST

## 2017-10-18 PROCEDURE — 87086 URINE CULTURE/COLONY COUNT: CPT | Performed by: HOSPITALIST

## 2017-10-18 PROCEDURE — 4A02X4A MEASUREMENT OF CARDIAC ELECTRICAL ACTIVITY, GUIDANCE, EXTERNAL APPROACH: ICD-10-PCS | Performed by: INTERNAL MEDICINE

## 2017-10-18 PROCEDURE — 82550 ASSAY OF CK (CPK): CPT | Performed by: HOSPITALIST

## 2017-10-18 PROCEDURE — 84145 PROCALCITONIN (PCT): CPT | Performed by: HOSPITALIST

## 2017-10-18 PROCEDURE — 77030034850

## 2017-10-18 PROCEDURE — 74011000258 HC RX REV CODE- 258: Performed by: HOSPITALIST

## 2017-10-18 PROCEDURE — 80048 BASIC METABOLIC PNL TOTAL CA: CPT | Performed by: INTERNAL MEDICINE

## 2017-10-18 PROCEDURE — 77030018719 HC DRSG PTCH ANTIMIC J&J -A

## 2017-10-18 PROCEDURE — 77030018786 HC NDL GD F/USND BARD -B

## 2017-10-18 PROCEDURE — 0T9B70Z DRAINAGE OF BLADDER WITH DRAINAGE DEVICE, VIA NATURAL OR ARTIFICIAL OPENING: ICD-10-PCS | Performed by: HOSPITALIST

## 2017-10-18 PROCEDURE — 36592 COLLECT BLOOD FROM PICC: CPT

## 2017-10-18 PROCEDURE — 84300 ASSAY OF URINE SODIUM: CPT | Performed by: HOSPITALIST

## 2017-10-18 PROCEDURE — 82533 TOTAL CORTISOL: CPT | Performed by: HOSPITALIST

## 2017-10-18 PROCEDURE — 97162 PT EVAL MOD COMPLEX 30 MIN: CPT

## 2017-10-18 PROCEDURE — 76450000000

## 2017-10-18 PROCEDURE — 84443 ASSAY THYROID STIM HORMONE: CPT | Performed by: HOSPITALIST

## 2017-10-18 PROCEDURE — 36569 INSJ PICC 5 YR+ W/O IMAGING: CPT | Performed by: INTERNAL MEDICINE

## 2017-10-18 PROCEDURE — 77030011256 HC DRSG MEPILEX <16IN NO BORD MOLN -A

## 2017-10-18 PROCEDURE — 36415 COLL VENOUS BLD VENIPUNCTURE: CPT | Performed by: HOSPITALIST

## 2017-10-18 RX ORDER — SODIUM CHLORIDE 0.9 % (FLUSH) 0.9 %
20 SYRINGE (ML) INJECTION EVERY 8 HOURS
Status: DISCONTINUED | OUTPATIENT
Start: 2017-10-18 | End: 2017-10-22 | Stop reason: HOSPADM

## 2017-10-18 RX ORDER — NOREPINEPHRINE BITARTRATE/D5W 4MG/250ML
2-16 PLASTIC BAG, INJECTION (ML) INTRAVENOUS
Status: DISCONTINUED | OUTPATIENT
Start: 2017-10-18 | End: 2017-10-18 | Stop reason: SDUPTHER

## 2017-10-18 RX ORDER — DEXTROSE 50 % IN WATER (D50W) INTRAVENOUS SYRINGE
25 AS NEEDED
Status: DISCONTINUED | OUTPATIENT
Start: 2017-10-18 | End: 2017-10-22 | Stop reason: HOSPADM

## 2017-10-18 RX ORDER — DEXTROSE 40 %
15 GEL (GRAM) ORAL AS NEEDED
Status: DISCONTINUED | OUTPATIENT
Start: 2017-10-18 | End: 2017-10-22 | Stop reason: HOSPADM

## 2017-10-18 RX ORDER — ALBUTEROL SULFATE 0.83 MG/ML
2.5 SOLUTION RESPIRATORY (INHALATION)
Status: DISCONTINUED | OUTPATIENT
Start: 2017-10-18 | End: 2017-10-18

## 2017-10-18 RX ORDER — VANCOMYCIN HYDROCHLORIDE
1250 SEE ADMIN INSTRUCTIONS
Status: DISCONTINUED | OUTPATIENT
Start: 2017-10-18 | End: 2017-10-20

## 2017-10-18 RX ORDER — HEPARIN 100 UNIT/ML
600 SYRINGE INTRAVENOUS AS NEEDED
Status: DISCONTINUED | OUTPATIENT
Start: 2017-10-18 | End: 2017-10-22 | Stop reason: HOSPADM

## 2017-10-18 RX ORDER — SODIUM POLYSTYRENE SULFONATE 15 G/60ML
15 SUSPENSION ORAL; RECTAL
Status: COMPLETED | OUTPATIENT
Start: 2017-10-18 | End: 2017-10-18

## 2017-10-18 RX ORDER — DEXTROSE 50 % IN WATER (D50W) INTRAVENOUS SYRINGE
25-50 AS NEEDED
Status: DISCONTINUED | OUTPATIENT
Start: 2017-10-18 | End: 2017-10-22 | Stop reason: HOSPADM

## 2017-10-18 RX ORDER — VANCOMYCIN/0.9 % SOD CHLORIDE 1.5G/250ML
1500 PLASTIC BAG, INJECTION (ML) INTRAVENOUS ONCE
Status: COMPLETED | OUTPATIENT
Start: 2017-10-18 | End: 2017-10-18

## 2017-10-18 RX ORDER — ALBUTEROL SULFATE 0.83 MG/ML
2.5 SOLUTION RESPIRATORY (INHALATION)
Status: DISCONTINUED | OUTPATIENT
Start: 2017-10-18 | End: 2017-10-19

## 2017-10-18 RX ORDER — HEPARIN 100 UNIT/ML
600 SYRINGE INTRAVENOUS EVERY 8 HOURS
Status: DISCONTINUED | OUTPATIENT
Start: 2017-10-18 | End: 2017-10-22 | Stop reason: HOSPADM

## 2017-10-18 RX ORDER — SODIUM CHLORIDE 0.9 % (FLUSH) 0.9 %
20 SYRINGE (ML) INJECTION AS NEEDED
Status: DISCONTINUED | OUTPATIENT
Start: 2017-10-18 | End: 2017-10-22 | Stop reason: HOSPADM

## 2017-10-18 RX ADMIN — HEPARIN SODIUM 5000 UNITS: 5000 INJECTION, SOLUTION INTRAVENOUS; SUBCUTANEOUS at 20:40

## 2017-10-18 RX ADMIN — SODIUM CHLORIDE, PRESERVATIVE FREE 600 UNITS: 5 INJECTION INTRAVENOUS at 16:40

## 2017-10-18 RX ADMIN — VANCOMYCIN HYDROCHLORIDE 1500 MG: 10 INJECTION, POWDER, LYOPHILIZED, FOR SOLUTION INTRAVENOUS at 11:17

## 2017-10-18 RX ADMIN — HEPARIN SODIUM 5000 UNITS: 5000 INJECTION, SOLUTION INTRAVENOUS; SUBCUTANEOUS at 04:19

## 2017-10-18 RX ADMIN — CEFEPIME 1 G: 1 INJECTION, POWDER, FOR SOLUTION INTRAMUSCULAR; INTRAVENOUS at 04:59

## 2017-10-18 RX ADMIN — ONDANSETRON 4 MG: 2 INJECTION INTRAMUSCULAR; INTRAVENOUS at 11:21

## 2017-10-18 RX ADMIN — ONDANSETRON 4 MG: 2 INJECTION INTRAMUSCULAR; INTRAVENOUS at 04:19

## 2017-10-18 RX ADMIN — DEXTROSE MONOHYDRATE 25 G: 25 INJECTION, SOLUTION INTRAVENOUS at 11:25

## 2017-10-18 RX ADMIN — ALBUTEROL SULFATE 2.5 MG: 2.5 SOLUTION RESPIRATORY (INHALATION) at 23:08

## 2017-10-18 RX ADMIN — SODIUM CHLORIDE 1000 ML: 900 INJECTION, SOLUTION INTRAVENOUS at 04:07

## 2017-10-18 RX ADMIN — Medication 10 ML: at 14:03

## 2017-10-18 RX ADMIN — Medication 10 ML: at 05:08

## 2017-10-18 RX ADMIN — SODIUM CHLORIDE 125 ML/HR: 900 INJECTION, SOLUTION INTRAVENOUS at 08:42

## 2017-10-18 RX ADMIN — SODIUM CHLORIDE 1000 ML: 900 INJECTION, SOLUTION INTRAVENOUS at 00:57

## 2017-10-18 RX ADMIN — ALBUTEROL SULFATE 2.5 MG: 2.5 SOLUTION RESPIRATORY (INHALATION) at 19:42

## 2017-10-18 RX ADMIN — ONDANSETRON 4 MG: 2 INJECTION INTRAMUSCULAR; INTRAVENOUS at 20:41

## 2017-10-18 RX ADMIN — ONDANSETRON 4 MG: 2 INJECTION INTRAMUSCULAR; INTRAVENOUS at 16:48

## 2017-10-18 RX ADMIN — SODIUM POLYSTYRENE SULFONATE 15 G: 15 SUSPENSION ORAL; RECTAL at 11:20

## 2017-10-18 RX ADMIN — ALBUTEROL SULFATE 2.5 MG: 2.5 SOLUTION RESPIRATORY (INHALATION) at 11:46

## 2017-10-18 RX ADMIN — ONDANSETRON 4 MG: 2 INJECTION INTRAMUSCULAR; INTRAVENOUS at 08:57

## 2017-10-18 RX ADMIN — HEPARIN SODIUM 5000 UNITS: 5000 INJECTION, SOLUTION INTRAVENOUS; SUBCUTANEOUS at 11:21

## 2017-10-18 RX ADMIN — Medication 20 ML: at 15:01

## 2017-10-18 RX ADMIN — NOREPINEPHRINE BITARTRATE 0.5 MCG/MIN: 4 SOLUTION at 05:01

## 2017-10-18 RX ADMIN — HYDROMORPHONE HYDROCHLORIDE 0.5 MG: 1 INJECTION, SOLUTION INTRAMUSCULAR; INTRAVENOUS; SUBCUTANEOUS at 16:40

## 2017-10-18 NOTE — PROGRESS NOTES
Problem: Mobility Impaired (Adult and Pediatric)  Goal: *Acute Goals and Plan of Care (Insert Text)  ALL GOALS MET :  (1.)Ms. James Eddy will move from supine to sit and sit to supine  with CONTACT GUARD ASSIST     (2.)Ms. James Eddy will transfer from bed to chair and chair to bed with MINIMAL ASSIST.  (3.)Ms. James Eddy will ambulate with CONTACT GUARD ASSIST for 10-15 feet with rolling walker. PHYSICAL THERAPY: Initial Assessment, Discharge, Treatment Day: Day of Assessment, AM 10/18/2017  INPATIENT: Hospital Day: 2  Payor: Alejo Frederick / Plan: [address] [place] [address] HMO / Product Type: Managed Care Medicare /      NAME/AGE/GENDER: Faye Lou is a 74 y.o. female   PRIMARY DIAGNOSIS: Acute renal failure (ARF) ([address]  Hyperkalemia  Renal cancer, unspecified laterality ([address]  Failure to thrive (0-17)  Malnourished ([address]  Left lower lobe pneumonia ([address] Acute renal failure (ARF) ([address] Acute renal failure (ARF) ([address]        ICD-10: Treatment Diagnosis:   · Generalized Muscle Weakness (M62.81)  · Difficulty in walking, Not elsewhere classified (R26.2)   Precaution/Allergies:  Review of patient's allergies indicates no known allergies. ASSESSMENT:     Ms. James Eddy presents with general weakness & is currently functioning at her baseline of function. This pt is also considering hospice @ home. PT advised pt & spouse to function as she currently is but with assist as noted, at all times, due to pt being a high fall risk. Pt sometimes gets up at home without calling for assist & falls. No further skilled therapy need at this time, pt is currently manageable for caregiver. This section established at most recent assessment   PROBLEM LIST (Impairments causing functional limitations):  1. NA   INTERVENTIONS PLANNED: (Benefits and precautions of physical therapy have been discussed with the patient.)  1.  NA     TREATMENT PLAN: Frequency/Duration: NA  Rehabilitation Potential For Stated Goals:NA     RECOMMENDED REHABILITATION/EQUIPMENT: (at time of discharge pending progress): Due to the probability of continued deficits (see above) this patient will not likely need continued skilled physical therapy after discharge. Equipment:    None at this time              HISTORY:   History of Present Injury/Illness (Reason for Referral):  Patient is 74years old female with a recent diagnosis of stage 4 renal cancer, HTN, DM-2, hypothyroidism, chronic back pain brought in for weakness, fatigue, SOB since last couple of days. Pt is following up with [facility], following up with oncologist, Dr. Araseli Helton. On review of records, pt has renal cell carcinoma with sarcomatoid features, with bilateral lung masses and right adrenal mass. Currently on Sutent, with plan to add gemcitabine. On this visit, pt is complaining of decreasing urine output over last 3 days, with worsening weakness, poor appetite, nausea or vomiting. She also complains of SOB, with occasional dry cough, intermittent diarrhea. She denies any fever, chills, chest pain, palpitations, hemoptysis, melena, hematochezia. Pt also endorsed significant > 70 pounds weight loss over last 1 year. Past Medical History/Comorbidities:   Ms. Jean Aburto  has a past medical history of Diabetes ([address] Hypertension; and Thyroid disease. Ms. Jean Aburto  has a past surgical history that includes back surgery; parathyroidectomy; cystocele repair; rectocele repair; and hysterectomy.   Social History/Living Environment:   Home Environment: Private residence  # Steps to Enter: 5  Rails to Enter: Yes  Hand Rails : Right  One/Two Story Residence: Two story, live on 1st floor  Living Alone: No  Support Systems: Spouse/Significant Other/Partner  Patient Expects to be Discharged to[de-identified] Private residence  Current DME Used/Available at Home: Walker, rolling  Prior Level of Function/Work/Activity:  Pt was needing minimal assist for transfers & CGA  for short distance ambualtion while using a rolling walker, prior to this admission  Personal Factors: Other factors that influence how disability is experienced by the patient:  Current & PMH   Number of Personal Factors/Comorbidities that affect the Plan of Care: 3+: HIGH COMPLEXITY   EXAMINATION:   Most Recent Physical Functioning:   Gross Assessment:  AROM: Generally decreased, functional (all limbs & core)  Strength: Generally decreased, functional (all limbs & core)  Coordination: Generally decreased, functional (all limbs & core)                    Balance:  Sitting: Intact; Without support  Standing: Impaired; With support (walker) Bed Mobility:  Supine to Sit: Contact guard assistance  Sit to Supine: Contact guard assistance  Scooting: Contact guard assistance       Transfers:  Sit to Stand: Minimum assistance  Stand to Sit: Minimum assistance  Bed to Chair: Minimum assistance (with walker)  Gait:     Speed/Yaquelin: Shuffled; Slow  Step Length: Left shortened;Right shortened  Gait Abnormalities: Decreased step clearance;Shuffling gait;Trunk sway increased  Distance (ft): 15 Feet (ft)  Assistive Device: Walker, rolling  Ambulation - Level of Assistance: Contact guard assistance   Functional Mobility:         Gait/Ambulation:  CGA        Transfers:  Min        Bed Mobility:  CGA   Body Structures Involved:  1. Lungs  2. Metabolic  3. Joints  4. Muscles Body Functions Affected:  1. Cardio  2. Movement Related  3. Metobolic/Endocrine Activities and Participation Affected:  1. General Tasks and Demands  2. Mobility   Number of elements that affect the Plan of Care: 4+: HIGH COMPLEXITY   CLINICAL PRESENTATION:   Presentation: Evolving clinical presentation with changing clinical characteristics: MODERATE COMPLEXITY   CLINICAL DECISION MAKIN [facility] Mobility Inpatient Short Form  How much difficulty does the patient currently have. .. Unable A Lot A Little None   1.   Turning over in bed (including adjusting bedclothes, sheets and blankets)? [] 1   [] 2   [x] 3   [] 4   2. Sitting down on and standing up from a chair with arms ( e.g., wheelchair, bedside commode, etc.)   [] 1   [] 2   [x] 3   [] 4   3. Moving from lying on back to sitting on the side of the bed? [] 1   [] 2   [x] 3   [] 4   How much help from another person does the patient currently need. .. Total A Lot A Little None   4. Moving to and from a bed to a chair (including a wheelchair)? [] 1   [] 2   [x] 3   [] 4   5. Need to walk in hospital room? [] 1   [] 2   [x] 3   [] 4   6. Climbing 3-5 steps with a railing? [x] 1   [] 2   [] 3   [] 4   © 2007, Trustees of [address] [place] [address], under license to [employer] All rights reserved      Score:  Initial: 16 Most Recent: X (Date: -- )    Interpretation of Tool:  Represents activities that are increasingly more difficult (i.e. Bed mobility, Transfers, Gait). Score 24 23 22-20 19-15 14-10 9-7 6     Modifier CH CI CJ CK CL CM CN      ? Mobility - Walking and Moving Around:     - CURRENT STATUS: CK - 40%-59% impaired, limited or restricted    - GOAL STATUS: CK - 40%-59% impaired, limited or restricted    - D/C STATUS:  CK - 40%-59% impaired, limited or restricted  Payor: Shanae Colon / Plan: [address] [place] [address] / Product Type: Managed Care Medicare /      Medical Necessity:     · no follow up therapy indicated. Reason for Services/Other Comments:  · no follow up therapy indicated.    Use of outcome tool(s) and clinical judgement create a POC that gives a: Questionable prediction of patient's progress: MODERATE COMPLEXITY            TREATMENT:   (In addition to Assessment/Re-Assessment sessions the following treatments were rendered)   Pre-treatment Symptoms/Complaints:  weakness  Pain: Initial: visual scale  Pain Intensity 1: 0  Post Session:  0/10     Assessment/Reassessment only, no treatment provided today    Braces/Orthotics/Lines/Etc:   · IV  · icu monitors  · O2 Device: Nasal cannula  Treatment/Session Assessment:    · Response to Treatment:  Easily fatigued with minimal exertion  · Interdisciplinary Collaboration:   o Registered Nurse  o   · After treatment position/precautions:   o Supine in bed  o Bed/Chair-wheels locked  o Bed in low position  o Caregiver at bedside  o Call light within reach  o RN notified  o Family at bedside   · Compliance with Program/Exercises: Will assess as treatment progresses. · Recommendations/ discharge PT services after evaluation.   Total Treatment Duration:  PT Patient Time In/Time Out  Time In: 1000  Time Out: 1000 [facility],

## 2017-10-18 NOTE — PROGRESS NOTES
TRANSFER - IN REPORT:    Verbal report received from [facility], RN(name) on [place] Tripathi  being received from ER(unit) for routine progression of care      Report consisted of patients Situation, Background, Assessment and   Recommendations(SBAR). Information from the following report(s) SBAR, Kardex, ED Summary, Recent Results, Med Rec Status and Cardiac Rhythm NSR was reviewed with the receiving nurse. Opportunity for questions and clarification was provided. Assessment completed upon patients arrival to unit and care assumed. Dual skin assessment with Mala Recinos RN. Patients sacrum red but blanchable. Small skin tear present. Barrier cream and Allevyn placed to area. Four abrasions on LLE. Pt uncertain of when or how she got them. Left open to RA. Skin otherwise intact.

## 2017-10-18 NOTE — CONSULTS
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 Started out with shortness of breath morning of admission along with generalized weakness and swelling in her legs. Over the past 6 months she reported vomiting and diarrhea that waxes and wanes. She also reported little po intake due to dysphagia/gagging. She has noticed decrease in urine output. In ER, creatinine was 3.88, increased from 1.54 (10/6), potassium of 6.2, lactic acid of 2.8. Procal 8.2. CXR showing left hilar mass with occlusion of left bronchus. She was started on cefepeim and vanc. She was admitted and then developed hypotension despite aggressive IV hydration. She now is requiring Levophed. Palliative care and hospice have been consulted. We are consulted for her history of mRCC.          Review of Systems:  ROS:  Constitutional: Positive for fatigue, malaise; negative for fever, chills  CV: Positive for edema; negative for chest pain, palpitations, . Respiratory: Positive for dyspnea; negative for cough, wheezing. GI: Positive for nausea, abdominal pain; negative for diarrhea.      No Known Allergies  Past Medical History:   Diagnosis Date    Diabetes ([facility] [address]     Hypertension     Thyroid disease      Past Surgical History:   Procedure Laterality Date    HX BACK SURGERY      HX CYSTOCELE REPAIR      HX HYSTERECTOMY      HX PARATHYROIDECTOMY      HX RECTOCELE REPAIR       Family History   Problem Relation Age of Onset    Heart Disease Father     Diabetes Other     Hypertension Other      Social History     Social History    Marital status:      Spouse name: N/A    Number of children: N/A    Years of education: N/A     Occupational History    Not on file.      Social History Main Topics    Smoking status: Former Smoker    Smokeless tobacco: Never Used    Alcohol use No    Drug use: Not on file    Sexual activity: Not on file     Other Topics Concern    Not on file     Social History Narrative    No narrative on file     Current Facility-Administered Medications   Medication Dose Route Frequency Provider Last Rate Last Dose    influenza vaccine 2017-18 (3 yrs+)(PF) (FLUZONE QUAD/FLUARIX QUAD) injection 0.5 mL  0.5 mL IntraMUSCular PRIOR TO DISCHARGE Geovani Cat MD        cefepime (MAXIPIME) 1 g in 0.9% sodium chloride (MBP/ADV) 50 mL  1 g IntraVENous Q24H Marbin Owusu  mL/hr at 10/19/17 0530 1 g at 10/19/17 0530    vancomycin (VANCOCIN) 1250 mg in  ml infusion  1,250 mg IntraVENous See Admin Instructions Marbin Owusu MD        dextrose (D50W) injection syrg 25 g  25 g IntraVENous PRN Yvonne Gaitan MD        NOREPINephrine (LEVOPHED) 4,000 mcg in dextrose 5% 250 mL infusion  2-16 mcg/min IntraVENous TITRATE Marbin Owusu MD 3.8 mL/hr at 10/18/17 0903 1 mcg/min at 10/18/17 0903    dextrose 40% (GLUTOSE) oral gel 1 Tube  15 g Oral PRN Yvonne Gaitan MD        glucagon [facility]) injection 1 mg  1 mg IntraMUSCular PRN Yvonne Gaitan MD        dextrose (D50W) injection syrg 12.5-25 g  25-50 mL IntraVENous PRN Yvonne Gaitan MD   25 g at 10/18/17 1125    sodium chloride (NS) flush 20 mL  20 mL InterCATHeter Q8H Geovani Cat MD   10 mL at 10/19/17 0530    heparin (porcine) pf 600 Units  600 Units InterCATHeter Q8H Geovani Cat MD   Stopped at 10/19/17 0600    sodium chloride (NS) flush 20 mL  20 mL InterCATHeter PRN Geovani Cat MD        heparin (porcine) pf 600 Units  600 Units InterCATHeter PRN Geovani Cat MD        fentaNYL ([address]) 75 mcg/hr patch 1 Patch  1 Patch TransDERmal Q72H Geovani Cat MD   1 Patch at 10/17/17 5266    mirtazapine (REMERON) tablet 15 mg  15 mg Oral QHS Hernesto Jackson MD   15 mg at 10/19/17 0028    sodium chloride (NS) flush 5-10 mL  5-10 mL IntraVENous Q8H Hernesto Jackson MD   Stopped at 10/19/17 0600    sodium chloride (NS) flush 5-10 mL  5-10 mL IntraVENous PRN Hernesto Jackson MD        acetaminophen (TYLENOL) tablet 650 mg  650 mg Oral Q6H PRN Hernesto Jackson MD        HYDROcodone-acetaminophen [facility]) 7.5-325 mg per tablet 1 Tab  1 Tab Oral Q4H PRN Hernesto Jackson MD        ondansetron [facility]) injection 4 mg  4 mg IntraVENous Q4H Hernesto Jackson MD   4 mg at 10/19/17 0930    magnesium hydroxide (MILK OF MAGNESIA) 400 mg/5 mL oral suspension 30 mL  30 mL Oral DAILY PRN Hernesto Jackson MD        heparin (porcine) injection 5,000 Units  5,000 Units SubCUTAneous Q8H Hernesto Jackson MD   5,000 Units at 10/19/17 0530    0.9% sodium chloride infusion  75 mL/hr IntraVENous CONTINUOUS Ayo Henry MD 75 mL/hr at 10/18/17 1900 75 mL/hr at 10/18/17 1900    insulin lispro (HUMALOG) injection   SubCUTAneous AC&HS Hernesto Jackson MD   Stopped at 10/17/17 2200    HYDROmorphone (PF) (DILAUDID) injection 0.5 mg  0.5 mg IntraVENous Q3H PRN Alex Francisco MD   0.5 mg at 10/18/17 1640       OBJECTIVE:  Patient Vitals for the past 8 hrs:   BP Temp Pulse Resp SpO2 Weight   10/19/17 0750 - - - - 100 % -   10/19/17 0745 109/50 98 °F (36.7 °C) 96 20 98 % -   10/19/17 0544 97/44 - 90 18 99 % -   10/19/17 0529 96/42 - 94 17 97 % -   10/19/17 0514 99/42 - 92 13 97 % -   10/19/17 0459 (!) 80/39 - 89 13 97 % -   10/19/17 0444 (!) 99/39 - 89 14 97 % -   10/19/17 0442 - - - - - 169 lb 4.8 oz (76.8 kg)   10/19/17 0429 93/47 - (!) 103 21 97 % -   10/19/17 0414 97/40 - 96 20 99 % -   10/19/17 0359 94/40 - 96 17 100 % -   10/19/17 0344 107/54 - (!) 105 28 95 % -   10/19/17 0336 - - - - 99 % -   10/19/17 0329 (!) 88/40 - 93 21 97 % -   10/19/17 0314 112/47 - 96 10 99 % -   10/19/17 0259 (!) 83/33 - 94 16 99 % -   10/19/17 0244 108/46 - (!) 105 22 99 % -     Temp (24hrs), Av.6 °F (37 °C), Min:98 °F (36.7 °C), Max:98.9 °F (37.2 °C)         Physical Exam:  Constitutional: Well developed, well nourished female in no acute distress, sitting comfortably in the hospital bed. Appears chronically ill. HEENT: Normocephalic and atraumatic. Oropharynx is clear, mucous membranes are moist.  Sclerae anicteric. Neck supple without JVD. No thyromegaly present. Lymph node   No palpable submandibular, cervical, supraclavicular lymph nodes. Skin Warm and dry. No bruising and no rash noted. No erythema. No pallor. Respiratory Lungs are diminished to auscultation bilaterally without wheezes, rales or rhonchi, normal air exchange without accessory muscle use. CVS Normal rate, regular rhythm and normal S1 and S2. No murmurs, gallops, or rubs. Abdomen Soft, moderately distended, minimal tenderness epigastrum, normoactive bowel sounds. No palpable mass. No hepatosplenomegaly. Neuro Grossly nonfocal with no obvious sensory or motor deficits. MSK Normal range of motion in general.  No edema and no tenderness. Psych Appropriate mood and affect.         Labs:    Recent Results (from the past 24 hour(s))   GLUCOSE, POC    Collection Time: 10/18/17 11:24 AM   Result Value Ref Range    Glucose (POC) 66 65 - 100 mg/dL   GLUCOSE, POC    Collection Time: 10/18/17 11:43 AM   Result Value Ref Range    Glucose (POC) 97 65 - 100 mg/dL   GLUCOSE, POC    Collection Time: 10/18/17 12:11 PM   Result Value Ref Range    Glucose (POC) 87 65 - 100 mg/dL   GLUCOSE, POC    Collection Time: 10/18/17  4:47 PM   Result Value Ref Range    Glucose (POC) 87 65 - 101 mg/dL   METABOLIC PANEL, BASIC    Collection Time: 10/18/17  4:49 PM   Result Value Ref Range    Sodium 138 136 - 145 mmol/L    Potassium 5.2 (H) 3.5 - 5.1 mmol/L    Chloride 105 98 - 107 mmol/L    CO2 19 (L) 21 - 32 mmol/L    Anion gap 14 7 - 16 mmol/L    Glucose 84 65 - 100 mg/dL    BUN 73 (H) 8 - 23 MG/DL    Creatinine 3.42 (H) 0.6 - 1.0 MG/DL    GFR est AA 17 (L) >60 ml/min/1.73m2    GFR est non-AA 14 (L) >60 ml/min/1.73m2    Calcium 8.0 (L) 8.3 - 76.9 MG/DL   METABOLIC PANEL, COMPREHENSIVE    Collection Time: 10/19/17 12:02 AM   Result Value Ref Range    Sodium 139 136 - 145 mmol/L    Potassium 4.9 3.5 - 5.1 mmol/L    Chloride 106 98 - 107 mmol/L    CO2 19 (L) 21 - 32 mmol/L    Anion gap 14 7 - 16 mmol/L    Glucose 82 65 - 100 mg/dL    BUN 72 (H) 8 - 23 MG/DL    Creatinine 3.36 (H) 0.6 - 1.0 MG/DL    GFR est AA 18 (L) >60 ml/min/1.73m2    GFR est non-AA 15 (L) >60 ml/min/1.73m2    Calcium 8.0 (L) 8.3 - 10.4 MG/DL    Bilirubin, total 0.2 0.2 - 1.1 MG/DL    ALT (SGPT) 8 (L) 12 - 65 U/L    AST (SGOT) 25 15 - 37 U/L    Alk. phosphatase 237 (H) 50 - 136 U/L    Protein, total 5.5 (L) 6.3 - 8.2 g/dL    Albumin 1.8 (L) 3.2 - 4.6 g/dL    Globulin 3.7 (H) 2.3 - 3.5 g/dL    A-G Ratio 0.5 (L) 1.2 - 3.5     GLUCOSE, POC    Collection Time: 10/19/17 12:04 AM   Result Value Ref Range    Glucose (POC) 83 65 - 100 mg/dL   GLUCOSE, POC    Collection Time: 10/19/17  7:52 AM   Result Value Ref Range    Glucose (POC) 80 65 - 100 mg/dL           ASSESSMENT:  Problem List  Date Reviewed: 10/6/2017          Codes Class Noted    Hyperkalemia ICD-10-CM: E87.5  ICD-9-CM: 276.7  10/17/2017        Malnourished ([facility] [address] ICD-10-CM: E46  ICD-9-CM: 263.9  10/17/2017        Failure to thrive (0-17) ICD-10-CM: R62.51  ICD-9-CM: 783.41  10/17/2017        * (Principal)Acute renal failure (ARF) ([facility] ICD-10-CM: N17.9  ICD-9-CM: 584.9  10/17/2017        Left [facility] ([facility] ICD-10-CM: J18.1  ICD-9-CM: 787  10/17/2017        Renal cancer, unspecified laterality ([facility] ICD-10-CM: C64.9  ICD-9-CM: 189.0  10/17/2017                RECOMMENDATIONS:      Lab studies and imaging studies  were personally reviewed. Pertinent old records were reviewed. Metastatic renal cell carcinoma: to adrenal, bones, lungs, nodes.   On Sutent for about 6 weeks with restaging planned for next week, but now with progressive VICTORIANO, hypotension, FTT. CT reviewed and shows progression of disease especially in adrenal nodule when compared to [facility] CT 8/4/17.  (CT not done up to lung disease). I discussed options with Ms. Ele Montiel. Disease appears refractory to TKI therapy. She is a poor candidate for additional treatment, with sarcomatoid histology prognosis is poor compared to conventional RCC. Hospice is a reasonable consideration. We will follow-up after she visits with [facility]. Thank you for allowing us to participate in the care of Ms. Ele Montiel.          MD [facility] Hematology [facility] Oncology  [address] [place] [address]  Office : [phone]  Fax : [phone]

## 2017-10-18 NOTE — PROGRESS NOTES
Pt with BP of 82/43 with MAP of 59. Dr. Antonio Drew notified. New orders for 500 cc NS bolus and notify if BP remains low. Will continue to monitor.

## 2017-10-18 NOTE — PROGRESS NOTES
Bedside and Verbal shift change report given to Presley Sutherland (oncoming nurse) by Alex Gutierrez (offgoing nurse). Report included the following information SBAR, Kardex, ED Summary, Procedure Summary, Intake/Output, MAR, Accordion, Recent Results, Med Rec Status, Cardiac Rhythm SR and Alarm Parameters .

## 2017-10-18 NOTE — HOSPICE
Hospice presentation set for 10/19/2017at 10am per 's request. Thank you for this referral  [address]  [phone]

## 2017-10-18 NOTE — PROGRESS NOTES
Dr. Diana Hare notified of morning labs and difficulty obtaining 2nd IV after multiple attempts by RN and charge RN. Orders to stop levo and give cefepime, delay vancocin dose and re assess for central line in AM. Will continue to monitor.

## 2017-10-18 NOTE — PROGRESS NOTES
Patient with BP of 76/38 with MAP of 55. Dr. Matilda Leos notified. New orders for 1 L bolus. Will continue to monitor.

## 2017-10-18 NOTE — PROGRESS NOTES
Pt with low BP 82/41 with MAP of 58. Dr. Deeann Meigs notified. Orders to increase NS to 125 mL/hr and notify if BP remains low. Will continue to monitor.

## 2017-10-18 NOTE — PROGRESS NOTES
PICC Placement Note    PRE-PROCEDURE VERIFICATION  Correct Procedure: yes. Time out completed with assistant Pat Schuler RN and all persons present in agreement with time out. Correct Site:  yes  Temperature: Temp: 98.6 °F (37 °C), Temperature Source: Temp Source: Oral  Recent Labs      10/18/17   0407   BUN  78*   CREA  3.65*   PLT  122*   WBC  4.2*     Allergies: Review of patient's allergies indicates no known allergies. Education materials for [facility] given to patient or family. PROCEDURE DETAIL  A double lumen PICC line was started for vascular access. The following documentation is in addition to the PICC properties in the lines/airways flowsheet :  Lot #: [id number]  xylocaine used: yes  Mid-Arm Circumference: 27 (cm)  Internal Catheter Length: 37 (cm)  Internal Catheter Total Length: 37 (cm)  Vein Selection for PICC:right brachial  Central Line Bundle followed yes  Complication Related to Insertion: none  Both the insertion guidewire and sherlock guidewire were removed intact all ports have positive blood return and were flush well with normal saline. The placement was verified by sapiens ecg. The ECG results state the tip overlies the lower superior vena cava.            Line is okay to use: yes    Jose Li RN

## 2017-10-18 NOTE — PROGRESS NOTES
Ms. Tenny Galeazzi continue to have Low BP despite of aggressive IV hydration. Only 200 cc urine out. Denies SOB, CP or cough. States that she is feeling better. Will start Levophed and will change antibiotics to Cefepime/ Vanc  Due to her recent chemotherapy. Check TSH and cortisol level. Will consult oncology and palliative care.      Chelly Pimentel MD  10/18/17

## 2017-10-18 NOTE — PROGRESS NOTES
Problem: Nutrition Deficit  Goal: *Optimize nutritional status  Nutrition  Reason for assessment:  Consult received for General Nutrition Management and Supplements per Dr. Neema Menon (10/18)  Assessment:   Diet order(s): Consistent CHO  Food/Nutrition Patient History:  Pt presented to ED with feet swelling, weakness, nausea and shortness of breath; admitted with LLL pneumonia. Past medical history notable for renal tumor with mets to the lungs and adrenal gland. Pt lives at home with spouse; spouse does the meal preparation; pt had minimal po intake at home; may drink 1 Ensure or Boost per day. Pt attributes poor po intake to n/v, more recently dry heaves, dysphagia and gagging. Pt states she was 264# 1 year ago. Pt has had poor po intake since admission; c/o sore / dry mouth-no nausea; on Zofran, and taste alteration. Pt receiving Remeron. Anthropometrics:Height: 5' 7\" (170.2 cm),  Weight: 75 kg (165 lb 4.8 oz), Weight Source: Bed, Body mass index is 25.89 kg/(m^2). BMI class of normal range for Older American Woman. Pt currently at 62% stated usual body weight from 1 year ago.      Meets Criteria for Malnutrition in the context of Acute Illness/Injury     [] Severe Malnutrition, as evidenced by:   [] Moderate to severe loss of muscle mass   [x] Nutritional intake of <50% of energy intake compared to estimated energy needs for > 5 days   [x] Weight loss of >2% in 1 week, >5% in 1 month, >7.5% in 3 months   [] Moderate to severe edema   [] Moderate to severe loss of subcutaneous fat         [] Non-Severe ( Moderate) Malnutrition, as evidenced by:   [] Nutritional intake of <75% of energy intake compared to estimated energy needs for > 7 days   [] Weight loss of 1-2% in 1 week, 5% in 1 month, 7.5% in 3 months     [] Mild loss of subcutaneous fat   [] Mild loss of muscle mass   [] Mild edema        Macronutrient needs:  EER:  5876-9833 kcal /day (25-30 kcal/kg BW)  EPR:  61-74 grams protein/day (1-1.2 grams/kg IBW) GFR 13    Intake/Comparative Standards: Per RD meal rounds, bites of jello at lunch today; no breakfast.  No recorded intake; based on verbalized intake, pt potentially meets < 5% estimated kcal and protein needs. Nutrition Diagnosis: Inadequate oral intake r/t decreased ability to consume sufficient oral intake as evidenced by estimates of insufficient intake of energy or high quality protein from diet when compared to requirements. Intervention:  1. Meals and snacks: Continue current diet. Pt agreeable to modify texture to pureed / soft foods r/t sore mouth. Noted food preferences and assisted with lunch and supper menu selection. 2.  Nutrition Supplement Therapy: Initiated Glucerna Shake supplement on all meal trays. 3.  Discharge nutrition plan: Too soon to determine.   4.  Coordination of Nutrition Care:  Interdisciplinary Rounds    Sudheer Montez, [address], [address] [place] [address], MPH  [phone]

## 2017-10-18 NOTE — PROGRESS NOTES
Pharmacokinetic Consult to Pharmacist    Danieljeannette Malik is a 74 y.o. female being treated for sepsis/pneumonia with Cefepime and Vancomycin. @[id number])@  @SAW43)@  Lab Results   Component Value Date/Time    BUN 78 10/18/2017 04:07 AM    Creatinine 3.65 10/18/2017 04:07 AM    WBC 4.2 10/18/2017 04:07 AM    Procalcitonin 8.2 10/18/2017 06:24 AM    Lactic Acid (POC) 2.8 10/17/2017 04:37 PM      Estimated Creatinine Clearance: 16.3 mL/min (based on Cr of 3.65). CULTURES:  Blood - NG x 12 hours  Urine - in process      Day 1 of vancomycin. Goal trough is 15 - 20. Vancomycin dose initiated at 1500 mg IV x 1 dose. Based on current renal function, continued dosing will be around random levels. Random level scheduled for tomorrow at 0900. Will continue to follow patient.       Thank you,    Paola Torre, PharmD

## 2017-10-18 NOTE — PROGRESS NOTES
Initial visit with patient, family and . I took a prayer shawl and cross to patient. Support given. Will continue to follow as needed. Aram Elkins M.Div.

## 2017-10-18 NOTE — H&P
HOSPITALIST H&P/CONSULT  NAME:  Fredi Teran   Age:  74 y.o.  :   1950   MRN:   [id number]  PCP: Ang Wagner MD  Consulting MD:  Treatment Team: Attending Provider: Celia Dyer MD; Consulting Provider: Yael Chilel MD; Consulting Provider: Russell Martinez MD; Utilization Review: Cam Hand RN  HPI:   Patient is 74years old female with a recent diagnosis of stage 4 renal cancer, HTN, DM-2, hypothyroidism, chronic back pain brought in for weakness, fatigue, SOB since last couple of days. Found to have VICTORIANO (Cr 3.8 ) from baseline 1.5, potassium 6.2, lactic acid 2.8   On review of records, pt has renal cell carcinoma with sarcomatoid features, with bilateral lung masses and right adrenal mass. Currently on Sutent, with plan to add gemcitabine. CXR showing left hilar mass with occlusion of left bronchus. On my assessment today she was found alert, oriented x 3, in no distress, was able to speak in full sentences. All questions answered. Patient stated she would like to be full code. Agreed to have meeting with Hospice team tomorrow.  involved. PICC line is planned today. On low dose levophed drip for hypotension. Complete ROS done and is as stated in HPI or otherwise negative  Past Medical History:   Diagnosis Date    Diabetes ([address]     Hypertension     Thyroid disease       Past Surgical History:   Procedure Laterality Date    HX BACK SURGERY      HX CYSTOCELE REPAIR      HX HYSTERECTOMY      HX PARATHYROIDECTOMY      HX RECTOCELE REPAIR        Prior to Admission Medications   Prescriptions Last Dose Informant Patient Reported? Taking? HYDROmorphone (DILAUDID) 4 mg tablet   No No   Sig: Take 1 Tab by mouth every three (3) hours as needed for Pain. Max Daily Amount: 32 mg.   fentaNYL (DURAGESIC) 75 mcg/hr   No No   Si Patch by TransDERmal route every seventy-two (72) hours.  Max Daily Amount: 1 Patch.   losartan-hydroCHLOROthiazide (HYZAAR) 50-12.5 mg per tablet Yes No   Sig: Take 1 Tab by mouth.   metFORMIN (GLUCOPHAGE) 1,000 mg tablet   Yes No   Sig: Take 1,000 mg by mouth.   mirtazapine (REMERON) 15 mg tablet   No No   Sig: Take 1 Tab by mouth nightly. ondansetron hcl (ZOFRAN) 8 mg tablet   Yes No   Sig: Take 8 mg by mouth. Facility-Administered Medications: None     No Known Allergies   Social History   Substance Use Topics    Smoking status: Former Smoker    Smokeless tobacco: Never Used    Alcohol use No      Family History   Problem Relation Age of Onset    Heart Disease Father     Diabetes Other     Hypertension Other       Objective:     Visit Vitals    BP 97/46    Pulse 96    Temp 98.6 °F (37 °C)    Resp (!) 37    Ht 5' 7\" (1.702 m)    Wt 75 kg (165 lb 4.8 oz)    SpO2 96%    BMI 25.89 kg/m2      Temp (24hrs), Av.2 °F (36.8 °C), Min:97.7 °F (36.5 °C), Max:98.6 °F (37 °C)    Oxygen Therapy  O2 Sat (%): 96 % (10/18/17 1314)  Pulse via Oximetry: 97 beats per minute (10/18/17 1314)  O2 Device: Nasal cannula (10/18/17 1214)  O2 Flow Rate (L/min): 2 l/min (10/18/17 1214)  FIO2 (%): 28 % (10/18/17 1147)  Physical Exam:  General:    Cachectic, temporal muscle wasting, mild distress    Head:   Normocephalic, without obvious abnormality, atraumatic. Nose:  Nares normal. No drainage or sinus tenderness. Lungs:   Diminished BS on left side, no wheezing  Heart:   Regular rhythm, tachycardic,  no murmur, rub or gallop. Abdomen:   Distended, non tender, no organomegaly felt, BS +   Extremities: No cyanosis. 1+ edema B/L LE. No clubbing  Skin:     Texture, turgor normal. No rashes or lesions.   Not Jaundiced  Neurologic: GCS 15, no motor or sensory deficits, CN 2-12 intact  Psych:             AO x3, mood and affect flat  Data Review:   Recent Results (from the past 24 hour(s))   CBC WITH AUTOMATED DIFF    Collection Time: 10/17/17  4:30 PM   Result Value Ref Range    WBC 4.6 4.3 - 11.1 K/uL    RBC 3.59 (L) 4.05 - 5.25 M/uL    HGB 9.2 (L) 11.7 - 15.4 g/dL    HCT 30.2 (L) 35.8 - 46.3 %    MCV 84.1 79.6 - 97.8 FL    MCH 25.6 (L) 26.1 - 32.9 PG    MCHC 30.5 (L) 31.4 - 35.0 g/dL    RDW 20.2 (H) 11.9 - 14.6 %    PLATELET 287 (L) 058 - 450 K/uL    MPV 9.4 (L) 10.8 - 14.1 FL    DF AUTOMATED      NEUTROPHILS 74 43 - 78 %    LYMPHOCYTES 21 13 - 44 %    MONOCYTES 4 4.0 - 12.0 %    EOSINOPHILS 1 0.5 - 7.8 %    BASOPHILS 0 0.0 - 2.0 %    IMMATURE GRANULOCYTES 0.2 0.0 - 5.0 %    ABS. NEUTROPHILS 3.4 1.7 - 8.2 K/UL    ABS. LYMPHOCYTES 0.9 0.5 - 4.6 K/UL    ABS. MONOCYTES 0.2 0.1 - 1.3 K/UL    ABS. EOSINOPHILS 0.1 0.0 - 0.8 K/UL    ABS. BASOPHILS 0.0 0.0 - 0.2 K/UL    ABS. IMM. GRANS. 0.0 0.0 - 0.5 K/UL   METABOLIC PANEL, COMPREHENSIVE    Collection Time: 10/17/17  4:30 PM   Result Value Ref Range    Sodium 134 (L) 136 - 145 mmol/L    Potassium 6.2 (HH) 3.5 - 5.1 mmol/L    Chloride 98 98 - 107 mmol/L    CO2 22 21 - 32 mmol/L    Anion gap 14 7 - 16 mmol/L    Glucose 72 65 - 100 mg/dL    BUN 77 (H) 8 - 23 MG/DL    Creatinine 3.88 (H) 0.6 - 1.0 MG/DL    GFR est AA 15 (L) >60 ml/min/1.73m2    GFR est non-AA 12 (L) >60 ml/min/1.73m2    Calcium 9.0 8.3 - 10.4 MG/DL    Bilirubin, total 0.3 0.2 - 1.1 MG/DL    ALT (SGPT) 10 (L) 12 - 65 U/L    AST (SGOT) 30 15 - 37 U/L    Alk.  phosphatase 293 (H) 50 - 136 U/L    Protein, total 7.2 6.3 - 8.2 g/dL    Albumin 2.4 (L) 3.2 - 4.6 g/dL    Globulin 4.8 (H) 2.3 - 3.5 g/dL    A-G Ratio 0.5 (L) 1.2 - 3.5     D DIMER    Collection Time: 10/17/17  4:30 PM   Result Value Ref Range    D DIMER 3.05 (HH) <0.55 ug/ml(FEU)   BNP    Collection Time: 10/17/17  4:30 PM   Result Value Ref Range    BNP 67 pg/mL   POC LACTIC ACID    Collection Time: 10/17/17  4:37 PM   Result Value Ref Range    Lactic Acid (POC) 2.8 (H) 0.5 - 1.9 mmol/L   POC TROPONIN-I    Collection Time: 10/17/17  4:37 PM   Result Value Ref Range    Troponin-I (POC) 0.01 (L) 0.02 - 0.05 ng/ml   CULTURE, BLOOD    Collection Time: 10/17/17  5:44 PM   Result Value Ref Range    Special Requests: RIGHT  Antecubital        Culture result: NO GROWTH AFTER 12 HOURS     CULTURE, BLOOD    Collection Time: 10/17/17  5:44 PM   Result Value Ref Range    Special Requests: LEFT  HAND        Culture result: NO GROWTH AFTER 12 HOURS     METABOLIC PANEL, BASIC    Collection Time: 10/17/17  8:25 PM   Result Value Ref Range    Sodium 135 (L) 136 - 145 mmol/L    Potassium 5.9 (H) 3.5 - 5.1 mmol/L    Chloride 101 98 - 107 mmol/L    CO2 23 21 - 32 mmol/L    Anion gap 11 7 - 16 mmol/L    Glucose 74 65 - 100 mg/dL    BUN 77 (H) 8 - 23 MG/DL    Creatinine 3.79 (H) 0.6 - 1.0 MG/DL    GFR est AA 15 (L) >60 ml/min/1.73m2    GFR est non-AA 13 (L) >60 ml/min/1.73m2    Calcium 8.8 8.3 - 10.4 MG/DL   GLUCOSE, POC    Collection Time: 10/17/17  9:29 PM   Result Value Ref Range    Glucose (POC) 71 65 - 100 mg/dL   SODIUM, UR, RANDOM    Collection Time: 10/18/17 12:29 AM   Result Value Ref Range    Sodium urine, random 20 MMOL/L   EOSINOPHILS, URINE    Collection Time: 10/18/17 12:29 AM   Result Value Ref Range    Eosinophils,urine NEGATIVE  NEG     POTASSIUM, UR, RANDOM    Collection Time: 10/18/17 12:29 AM   Result Value Ref Range    Potassium urine, random 69 MMOL/L   URINALYSIS W/ RFLX MICROSCOPIC    Collection Time: 10/18/17 12:29 AM   Result Value Ref Range    Color LES      Appearance CLOUDY      Specific gravity 1.016 1.001 - 1.023      pH (UA) 5.0 5.0 - 9.0      Protein 30 (A) NEG mg/dL    Glucose NEGATIVE  mg/dL    Ketone TRACE (A) NEG mg/dL    Bilirubin MODERATE (A) NEG      Blood NEGATIVE  NEG      Urobilinogen 0.2 0.2 - 1.0 EU/dL    Nitrites NEGATIVE  NEG      Leukocyte Esterase NEGATIVE  NEG      WBC 0-3 0 /hpf    RBC 0-3 0 /hpf    Epithelial cells 0-3 0 /hpf    Bacteria 1+ (H) 0 /hpf    Casts HYALINE 0 /lpf    Amorphous Crystals 2+ (H) 0   PROTEIN/CREATININE RATIO, URINE    Collection Time: 10/18/17 12:29 AM   Result Value Ref Range    Protein, urine random 111 (H) <11.9 mg/dL    Creatinine, urine 198.35 mg/dL Protein/Creat. urine Ratio 0.6     METABOLIC PANEL, COMPREHENSIVE    Collection Time: 10/18/17  4:07 AM   Result Value Ref Range    Sodium 138 136 - 145 mmol/L    Potassium 5.8 (H) 3.5 - 5.1 mmol/L    Chloride 104 98 - 107 mmol/L    CO2 20 (L) 21 - 32 mmol/L    Anion gap 14 7 - 16 mmol/L    Glucose 64 (L) 65 - 100 mg/dL    BUN 78 (H) 8 - 23 MG/DL    Creatinine 3.65 (H) 0.6 - 1.0 MG/DL    GFR est AA 16 (L) >60 ml/min/1.73m2    GFR est non-AA 13 (L) >60 ml/min/1.73m2    Calcium 8.0 (L) 8.3 - 10.4 MG/DL    Bilirubin, total 0.2 0.2 - 1.1 MG/DL    ALT (SGPT) 8 (L) 12 - 65 U/L    AST (SGOT) 26 15 - 37 U/L    Alk.  phosphatase 231 (H) 50 - 136 U/L    Protein, total 5.9 (L) 6.3 - 8.2 g/dL    Albumin 1.9 (L) 3.2 - 4.6 g/dL    Globulin 4.0 (H) 2.3 - 3.5 g/dL    A-G Ratio 0.5 (L) 1.2 - 3.5     CBC W/O DIFF    Collection Time: 10/18/17  4:07 AM   Result Value Ref Range    WBC 4.2 (L) 4.3 - 11.1 K/uL    RBC 3.09 (L) 4.05 - 5.25 M/uL    HGB 7.7 (L) 11.7 - 15.4 g/dL    HCT 26.1 (L) 35.8 - 46.3 %    MCV 84.5 79.6 - 97.8 FL    MCH 24.9 (L) 26.1 - 32.9 PG    MCHC 29.5 (L) 31.4 - 35.0 g/dL    RDW 20.4 (H) 11.9 - 14.6 %    PLATELET 311 (L) 143 - 450 K/uL    MPV 9.3 (L) 10.8 - 14.1 FL   CK    Collection Time: 10/18/17  4:07 AM   Result Value Ref Range    CK 18 (L) 21 - 215 U/L   MAGNESIUM    Collection Time: 10/18/17  4:07 AM   Result Value Ref Range    Magnesium 1.8 1.8 - 2.4 mg/dL   TSH 3RD GENERATION    Collection Time: 10/18/17  4:07 AM   Result Value Ref Range    TSH 6.320 (H) 0.358 - 3.740 uIU/mL   CORTISOL, AM    Collection Time: 10/18/17  6:24 AM   Result Value Ref Range    Cortisol, a.m. 24.8 7 - 25 ug/dL   PROCALCITONIN    Collection Time: 10/18/17  6:24 AM   Result Value Ref Range    Procalcitonin 8.2 ng/mL   GLUCOSE, POC    Collection Time: 10/18/17  7:46 AM   Result Value Ref Range    Glucose (POC) 72 65 - 100 mg/dL   GLUCOSE, POC    Collection Time: 10/18/17  8:44 AM   Result Value Ref Range    Glucose (POC) 64 (L) 65 - 100 mg/dL   GLUCOSE, POC    Collection Time: 10/18/17  9:41 AM   Result Value Ref Range    Glucose (POC) 72 65 - 100 mg/dL   GLUCOSE, POC    Collection Time: 10/18/17 11:24 AM   Result Value Ref Range    Glucose (POC) 66 65 - 100 mg/dL   GLUCOSE, POC    Collection Time: 10/18/17 11:43 AM   Result Value Ref Range    Glucose (POC) 97 65 - 100 mg/dL   GLUCOSE, POC    Collection Time: 10/18/17 12:11 PM   Result Value Ref Range    Glucose (POC) 87 65 - 100 mg/dL     Imaging /Procedures /Studies   Portable chest x-ray     INDICATION: Worsening shortness of breath     FINDINGS: No prior studies for comparison. There is a left perihilar mass with  asymmetric elevation of the left diaphragm with left basal atelectasis. Heart  size is normal. No pulmonary edema or pleural effusion.     IMPRESSION  IMPRESSION: Left hilar lung mass probably resultant atelectasis in the left  lower lobe from airway obstruction. Assessment and Plan: Active Hospital Problems    Diagnosis Date Noted    Hyperkalemia 10/17/2017    Malnourished ([address] 10/17/2017    Failure to thrive (0-17) 10/17/2017    Acute renal failure (ARF) ([address] 10/17/2017    Left lower lobe pneumonia ([address] 10/17/2017    Renal cancer, unspecified laterality ([address] 10/17/2017       1. oliguric renal failure, with hyperkalemia likely due to dehydration/ Metastatic renal cancer/ medication related- continue IVF-, monitor I/O- avoid nephrotoxic meds- kayexalate, dextrose and IV insulin, monitor chemistry q 6hrs- may consult renal if worsening   2. left lower lobe pneumonia: currently on vanco and cefepime- PICC line today- continue to monitor- await cultures   3. stage 4 metastatic renal cancer: hospice meeting tomorrow-    · DVT prophylaxis with heparin. · Opioid pain meds as needed. · Failure to thrive: dietician consulted, will continue remeron and add megace. · POC monitoring qachs, SSI.     Code Status: Full code-     Anticipated discharge: >2-3 MN    Signed By: Kyle Monroy Lety Miguel MD     October 18, 2017

## 2017-10-18 NOTE — PROGRESS NOTES
Pts BP 73/35 MAP 52. Dr. Young Never notified. New orders to give 1 L bolus and notify if BP remains low. Will continue to monitor.

## 2017-10-18 NOTE — PROGRESS NOTES
Progress Note    Patient: Ana Laura Kaur MRN: [id number]  SSN: [id number]    YOB: 1950  Age: 74 y.o. Sex: female      Admit Date: 10/17/2017    LOS: 1 day     Subjective:   Patient is 74years old female with a recent diagnosis of stage 4 renal cancer, HTN, DM-2, hypothyroidism, chronic back pain brought in for weakness, fatigue, SOB since last couple of days. Found to have VICTORIANO (Cr 3.8 ) from baseline 1.5, potassium 6.2, lactic acid 2.8   On review of records, pt has renal cell carcinoma with sarcomatoid features, with bilateral lung masses and right adrenal mass. Currently on Sutent, with plan to add gemcitabine. CXR showing left hilar mass with occlusion of left bronchus.     On my assessment today she was found alert, oriented x 3, in no distress, was able to speak in full sentences. All questions answered. Patient stated she would like to be full code. Agreed to have meeting with Hospice team tomorrow.  involved. PICC line is planned today. On low dose levophed drip for hypotension      Objective:     Vitals:    10/18/17 1544 10/18/17 1614 10/18/17 1629 10/18/17 1644   BP: 108/57 91/47 (!) 89/49 90/49   Pulse: 97 87 89 84   Resp:  19 17 24   Temp:   98.9 °F (37.2 °C)    SpO2: 100% 98% 97% 99%   Weight:       Height:            Intake and Output:  Current Shift: 10/18 0701 - 10/18 1900  In: -   Out: 260 [Urine:260]  Last three shifts: 10/16 1901 - 10/18 0700  In: 3198.4 [I.V.:3198.4]  Out: 200 [Urine:200]    Physical Exam:   GENERAL: alert, cooperative, no distress, appears stated age  EYE: negative  LYMPHATIC: Cervical, supraclavicular, and axillary nodes normal.   THROAT & NECK: normal and no erythema or exudates noted. LUNG: clear to auscultation bilaterally  HEART: regular rate and rhythm, S1, S2 normal, no murmur, click, rub or gallop  ABDOMEN: soft, non-tender.  Bowel sounds normal. No masses,  no organomegaly  EXTREMITIES:  extremities normal, atraumatic, no cyanosis or edema  SKIN: Normal.  NEUROLOGIC: negative  PSYCHIATRIC: non focal    Lab/Data Review: All lab results for the last 24 hours reviewed. Assessment:     Principal Problem:    Acute renal failure (ARF) ([facility] (10/17/2017)    Active Problems:    Hyperkalemia (10/17/2017)      Malnourished ([address] (10/17/2017)      Failure to thrive (0-17) (10/17/2017)      Left lower lobe pneumonia ([facility] (10/17/2017)      Renal cancer, unspecified laterality ([facility] (10/17/2017)        Plan:     1. oliguric chiquis on CKD, pre-renal in etiology with Fena of 0.3% likely due to dehydration / hypotension: associated with hyperkalemia:  continue IVF-, monitor I/O- avoid nephrotoxic meds- kayexalate x 1 today, start albuterol q 4hrs. Provide dextrose and IV insulin prn- monitor chemistry q 8hrs- may consult renal if worsening- so far Cr and potassium improving-     2.  left lower lobe pneumonia with left bronchus obstruction ( metastasis ) : on o2 nasal cannula, saturating well- currently on vanco and cefepime- PICC line ordered- await cultures    3. Hypotension, possible due to pneumonia: on IV fluids and levophed low dose ( 1-2mcg) - continue to monitor     4. stage 4 metastatic renal cancer: hospice meeting tomorrow- Oncology consulted      · DVT prophylaxis with heparin. · Opioid pain meds as needed.   · Failure to thrive: dietician consulted, on remeron   · POC monitoring qachs, SSI.     Code Status: Full code-      Anticipated discharge: >2-3 MN    Signed By: Adilia Ferris MD     October 18, 2017

## 2017-10-18 NOTE — PROGRESS NOTES
OT note: attempted to see patient earlier this afternoon. She reported she was very tired and her lunch tray arrived. Will reattempt at another time.  Thank you for this referral. Davis Noriega OTR/TORO

## 2017-10-19 LAB
ALBUMIN SERPL-MCNC: 1.8 G/DL (ref 3.2–4.6)
ALBUMIN SERPL-MCNC: 1.8 G/DL (ref 3.2–4.6)
ALBUMIN/GLOB SERPL: 0.5 {RATIO} (ref 1.2–3.5)
ALBUMIN/GLOB SERPL: 0.5 {RATIO} (ref 1.2–3.5)
ALP SERPL-CCNC: 229 U/L (ref 50–136)
ALP SERPL-CCNC: 237 U/L (ref 50–136)
ALT SERPL-CCNC: 8 U/L (ref 12–65)
ALT SERPL-CCNC: 8 U/L (ref 12–65)
ANION GAP SERPL CALC-SCNC: 11 MMOL/L (ref 7–16)
ANION GAP SERPL CALC-SCNC: 14 MMOL/L (ref 7–16)
AST SERPL-CCNC: 20 U/L (ref 15–37)
AST SERPL-CCNC: 25 U/L (ref 15–37)
BILIRUB SERPL-MCNC: 0.2 MG/DL (ref 0.2–1.1)
BILIRUB SERPL-MCNC: 0.2 MG/DL (ref 0.2–1.1)
BUN SERPL-MCNC: 70 MG/DL (ref 8–23)
BUN SERPL-MCNC: 72 MG/DL (ref 8–23)
CALCIUM SERPL-MCNC: 8 MG/DL (ref 8.3–10.4)
CALCIUM SERPL-MCNC: 8 MG/DL (ref 8.3–10.4)
CHLORIDE SERPL-SCNC: 106 MMOL/L (ref 98–107)
CHLORIDE SERPL-SCNC: 107 MMOL/L (ref 98–107)
CO2 SERPL-SCNC: 19 MMOL/L (ref 21–32)
CO2 SERPL-SCNC: 20 MMOL/L (ref 21–32)
CREAT SERPL-MCNC: 3.19 MG/DL (ref 0.6–1)
CREAT SERPL-MCNC: 3.36 MG/DL (ref 0.6–1)
ERYTHROCYTE [DISTWIDTH] IN BLOOD BY AUTOMATED COUNT: 20.7 % (ref 11.9–14.6)
GLOBULIN SER CALC-MCNC: 3.7 G/DL (ref 2.3–3.5)
GLOBULIN SER CALC-MCNC: 3.8 G/DL (ref 2.3–3.5)
GLUCOSE BLD STRIP.AUTO-MCNC: 80 MG/DL (ref 65–100)
GLUCOSE BLD STRIP.AUTO-MCNC: 83 MG/DL (ref 65–100)
GLUCOSE BLD STRIP.AUTO-MCNC: 88 MG/DL (ref 65–100)
GLUCOSE SERPL-MCNC: 82 MG/DL (ref 65–100)
GLUCOSE SERPL-MCNC: 82 MG/DL (ref 65–100)
HCT VFR BLD AUTO: 25.3 % (ref 35.8–46.3)
HGB BLD-MCNC: 7.3 G/DL (ref 11.7–15.4)
MCH RBC QN AUTO: 24.8 PG (ref 26.1–32.9)
MCHC RBC AUTO-ENTMCNC: 28.9 G/DL (ref 31.4–35)
MCV RBC AUTO: 86.1 FL (ref 79.6–97.8)
PLATELET # BLD AUTO: 167 K/UL (ref 150–450)
PMV BLD AUTO: 8.9 FL (ref 10.8–14.1)
POTASSIUM SERPL-SCNC: 4.7 MMOL/L (ref 3.5–5.1)
POTASSIUM SERPL-SCNC: 4.9 MMOL/L (ref 3.5–5.1)
PROT SERPL-MCNC: 5.5 G/DL (ref 6.3–8.2)
PROT SERPL-MCNC: 5.6 G/DL (ref 6.3–8.2)
RBC # BLD AUTO: 2.94 M/UL (ref 4.05–5.25)
SODIUM SERPL-SCNC: 138 MMOL/L (ref 136–145)
SODIUM SERPL-SCNC: 139 MMOL/L (ref 136–145)
VANCOMYCIN SERPL-MCNC: 13.4 UG/ML
WBC # BLD AUTO: 5.8 K/UL (ref 4.3–11.1)

## 2017-10-19 PROCEDURE — 36430 TRANSFUSION BLD/BLD COMPNT: CPT

## 2017-10-19 PROCEDURE — 99223 1ST HOSP IP/OBS HIGH 75: CPT | Performed by: INTERNAL MEDICINE

## 2017-10-19 PROCEDURE — 74011000258 HC RX REV CODE- 258: Performed by: HOSPITALIST

## 2017-10-19 PROCEDURE — 77010033678 HC OXYGEN DAILY

## 2017-10-19 PROCEDURE — 74011250636 HC RX REV CODE- 250/636: Performed by: HOSPITALIST

## 2017-10-19 PROCEDURE — 86923 COMPATIBILITY TEST ELECTRIC: CPT | Performed by: INTERNAL MEDICINE

## 2017-10-19 PROCEDURE — 80202 ASSAY OF VANCOMYCIN: CPT | Performed by: HOSPITALIST

## 2017-10-19 PROCEDURE — 80053 COMPREHEN METABOLIC PANEL: CPT | Performed by: INTERNAL MEDICINE

## 2017-10-19 PROCEDURE — 85027 COMPLETE CBC AUTOMATED: CPT | Performed by: HOSPITALIST

## 2017-10-19 PROCEDURE — 82962 GLUCOSE BLOOD TEST: CPT

## 2017-10-19 PROCEDURE — 30233N1 TRANSFUSION OF NONAUTOLOGOUS RED BLOOD CELLS INTO PERIPHERAL VEIN, PERCUTANEOUS APPROACH: ICD-10-PCS | Performed by: INTERNAL MEDICINE

## 2017-10-19 PROCEDURE — 80053 COMPREHEN METABOLIC PANEL: CPT | Performed by: HOSPITALIST

## 2017-10-19 PROCEDURE — 74011250636 HC RX REV CODE- 250/636: Performed by: INTERNAL MEDICINE

## 2017-10-19 PROCEDURE — 77030019605

## 2017-10-19 PROCEDURE — 86900 BLOOD TYPING SEROLOGIC ABO: CPT | Performed by: INTERNAL MEDICINE

## 2017-10-19 PROCEDURE — 74011000250 HC RX REV CODE- 250: Performed by: INTERNAL MEDICINE

## 2017-10-19 PROCEDURE — 65610000001 HC ROOM ICU GENERAL

## 2017-10-19 PROCEDURE — 77030013131 HC IV BLD ST ICUM -A

## 2017-10-19 PROCEDURE — 94640 AIRWAY INHALATION TREATMENT: CPT

## 2017-10-19 PROCEDURE — P9016 RBC LEUKOCYTES REDUCED: HCPCS | Performed by: INTERNAL MEDICINE

## 2017-10-19 PROCEDURE — 74011250637 HC RX REV CODE- 250/637: Performed by: HOSPITALIST

## 2017-10-19 PROCEDURE — 74011250637 HC RX REV CODE- 250/637: Performed by: INTERNAL MEDICINE

## 2017-10-19 RX ORDER — SODIUM CHLORIDE 9 MG/ML
250 INJECTION, SOLUTION INTRAVENOUS AS NEEDED
Status: DISCONTINUED | OUTPATIENT
Start: 2017-10-19 | End: 2017-10-22 | Stop reason: HOSPADM

## 2017-10-19 RX ORDER — MAG HYDROX/ALUMINUM HYD/SIMETH 200-200-20
30 SUSPENSION, ORAL (FINAL DOSE FORM) ORAL
Status: DISCONTINUED | OUTPATIENT
Start: 2017-10-19 | End: 2017-10-22 | Stop reason: HOSPADM

## 2017-10-19 RX ORDER — VANCOMYCIN HYDROCHLORIDE
1250 ONCE
Status: COMPLETED | OUTPATIENT
Start: 2017-10-19 | End: 2017-10-19

## 2017-10-19 RX ADMIN — Medication 10 ML: at 15:37

## 2017-10-19 RX ADMIN — MIRTAZAPINE 15 MG: 15 TABLET, FILM COATED ORAL at 00:28

## 2017-10-19 RX ADMIN — MIRTAZAPINE 15 MG: 15 TABLET, FILM COATED ORAL at 21:31

## 2017-10-19 RX ADMIN — SODIUM CHLORIDE, PRESERVATIVE FREE 300 UNITS: 5 INJECTION INTRAVENOUS at 00:28

## 2017-10-19 RX ADMIN — ALBUTEROL SULFATE 2.5 MG: 2.5 SOLUTION RESPIRATORY (INHALATION) at 03:35

## 2017-10-19 RX ADMIN — ONDANSETRON 4 MG: 2 INJECTION INTRAMUSCULAR; INTRAVENOUS at 16:13

## 2017-10-19 RX ADMIN — ONDANSETRON 4 MG: 2 INJECTION INTRAMUSCULAR; INTRAVENOUS at 21:31

## 2017-10-19 RX ADMIN — HYDROMORPHONE HYDROCHLORIDE 0.5 MG: 1 INJECTION, SOLUTION INTRAMUSCULAR; INTRAVENOUS; SUBCUTANEOUS at 11:06

## 2017-10-19 RX ADMIN — ONDANSETRON 4 MG: 2 INJECTION INTRAMUSCULAR; INTRAVENOUS at 12:00

## 2017-10-19 RX ADMIN — VANCOMYCIN HYDROCHLORIDE 1250 MG: 10 INJECTION, POWDER, LYOPHILIZED, FOR SOLUTION INTRAVENOUS at 12:16

## 2017-10-19 RX ADMIN — SODIUM CHLORIDE 75 ML/HR: 900 INJECTION, SOLUTION INTRAVENOUS at 12:17

## 2017-10-19 RX ADMIN — HEPARIN SODIUM 5000 UNITS: 5000 INJECTION, SOLUTION INTRAVENOUS; SUBCUTANEOUS at 12:01

## 2017-10-19 RX ADMIN — Medication 10 ML: at 00:29

## 2017-10-19 RX ADMIN — CEFEPIME 1 G: 1 INJECTION, POWDER, FOR SOLUTION INTRAMUSCULAR; INTRAVENOUS at 05:30

## 2017-10-19 RX ADMIN — SODIUM CHLORIDE, PRESERVATIVE FREE 600 UNITS: 5 INJECTION INTRAVENOUS at 21:31

## 2017-10-19 RX ADMIN — ONDANSETRON 4 MG: 2 INJECTION INTRAMUSCULAR; INTRAVENOUS at 00:28

## 2017-10-19 RX ADMIN — Medication 10 ML: at 11:07

## 2017-10-19 RX ADMIN — ALBUTEROL SULFATE 2.5 MG: 2.5 SOLUTION RESPIRATORY (INHALATION) at 07:49

## 2017-10-19 RX ADMIN — HEPARIN SODIUM 5000 UNITS: 5000 INJECTION, SOLUTION INTRAVENOUS; SUBCUTANEOUS at 05:30

## 2017-10-19 RX ADMIN — HEPARIN SODIUM 5000 UNITS: 5000 INJECTION, SOLUTION INTRAVENOUS; SUBCUTANEOUS at 21:31

## 2017-10-19 RX ADMIN — ALUMINUM HYDROXIDE, MAGNESIUM HYDROXIDE, AND SIMETHICONE 30 ML: 200; 200; 20 SUSPENSION ORAL at 16:13

## 2017-10-19 RX ADMIN — Medication 10 ML: at 05:30

## 2017-10-19 RX ADMIN — Medication 20 ML: at 21:32

## 2017-10-19 RX ADMIN — ONDANSETRON 4 MG: 2 INJECTION INTRAMUSCULAR; INTRAVENOUS at 09:30

## 2017-10-19 RX ADMIN — ONDANSETRON 4 MG: 2 INJECTION INTRAMUSCULAR; INTRAVENOUS at 05:29

## 2017-10-19 NOTE — PROGRESS NOTES
OT note: attempted to see patient this am. Nursing giving pain meds and discussed patient status. Patient and  met with hospice this am. Will re attempt tomorrow if appropriate.  Thank you for this referral. Hector Norris OTR/L

## 2017-10-19 NOTE — HOSPICE
Met with patient and spouse at bedside and discussed hospice philosophy, levels of care, medication and symptom management, goals of care. Spouse states he was unaware that treatment would be stopped and they are unsure if this is what they want to do presently. Explained comfort care and how for some patients stopping treatment is not stopping care but changing the goals to provide the best quality of life that we can. Spouse and patient understood. Brochures and contact numbers left with them. They feel they have some further questions for the oncologist before they make the decision to move to comfort care. Thank you for this referral. I will continue to follow for any further needs or questions.   Baldomero Goodson, JESSICA Veras fransico Nurse Liaison  [facility]  [phone]

## 2017-10-19 NOTE — HOSPICE
Met with patient and spouse at bedside to discuss hospice philosophy, levels of care, symptom and medication management. Spouse states he was unaware that going to hospice meant stopping treatments. Explained that comfort care is changing the goals of care by stopping further life extending treatments when those treatments are not working any longer and the focus would move to  providing the best quality life with comfort. Spouse and patient voiced understanding and feel that they have some further questions for the oncologist prior to making this decision. Brochure and contact numbers left with them. I will continue to follow for any further needs or questions.  Thank you for this referral.  Yousif Barnett RN  [facility] Nurse Liaison  [facility]  [phone]

## 2017-10-19 NOTE — PROGRESS NOTES
Pharmacokinetic Consult to Pharmacist    Van Parmjit is a 74 y.o. female being treated for sepsis/PNA with cefepime and vancomycin. Height: 5' 7\" (170.2 cm)  Weight: 76.8 kg (169 lb 4.8 oz)  Lab Results   Component Value Date/Time    BUN 70 10/19/2017 11:05 AM    Creatinine 3.19 10/19/2017 11:05 AM    WBC 5.8 10/19/2017 11:05 AM    Procalcitonin 8.2 10/18/2017 06:24 AM    Lactic Acid (POC) 2.8 10/17/2017 04:37 PM      Estimated Creatinine Clearance: 18.8 mL/min (based on Cr of 3.19). CULTURES:  Blood - NG x 2 days  Urine - NG x 1 day      Lab Results   Component Value Date/Time    Vancomycin, random 13.4 10/19/2017 11:05 AM       Day 2 of vancomycin. Goal trough is 15 - 20. Due to impaired renal function (CrCl ~ 18.8 ml/min), pharmacy is dosing vancomycin around random levels. Pharmacy will re-dose vancomycin when random level is < 20  Based on random level today, pt received Vancomycin 1250 mg IV x 1 dose at 1216  Next random level is scheduled for tomorrow at 0900  Pharmacy will continue to follow patient.       Thank you,    Devora Alexander, PharmD

## 2017-10-19 NOTE — PROGRESS NOTES
Report received, chart reviewed. PRBC completed. Levophed drip remains at 2 mcg/min. Blood pressure acceptable, will attempt to wean drip off. No complaints verbalized.  at bedside.

## 2017-10-19 NOTE — PROGRESS NOTES
Progress Note    Patient: Sara Mims MRN: [id number]  SSN: [id number]    YOB: 1950  Age: 74 y.o. Sex: female      Admit Date: 10/17/2017    LOS: 2 days     Subjective:   Patient is 74years old female with a recent diagnosis of stage 4 renal cancer, HTN, DM-2, hypothyroidism, chronic back pain brought in for weakness, fatigue, SOB since last couple of days. Found to have VICTORIANO (Cr 3.8 ) from baseline 1.5, potassium 6.2, lactic acid 2.8   On review of records, pt has renal cell carcinoma with sarcomatoid features, with bilateral lung masses and right adrenal mass. CXR showing left hilar mass with occlusion of left bronchus.     On my assessment today she was found at bedside, stated feeling better than yesterday. BP stable, eating. Still oliguric but hyperkalemia resolved. Cr trending down. Hospice meeting today determined that patient would like to express to Dr Herb Rodriguez some concerns before making a final decision in terms of final advance directives and goals of care. Denied nausea, vomiting, chest pain, diarrhea, dizziness. Objective:     Vitals:    10/19/17 0529 10/19/17 0544 10/19/17 0745 10/19/17 0750   BP: 96/42 97/44 109/50    Pulse: 94 90 96    Resp: 17 18 20    Temp:   98 °F (36.7 °C)    SpO2: 97% 99% 98% 100%   Weight:       Height:            Intake and Output:  Current Shift:    Last three shifts: 10/17 1901 - 10/19 0700  In: 4278.8 [I.V.:4278.8]  Out: 041 [Urine:660]    Physical Exam:   GENERAL: alert, cooperative, no distress, appears stated age  EYE: negative  LYMPHATIC: Cervical, supraclavicular, and axillary nodes normal.   THROAT & NECK: normal and no erythema or exudates noted. LUNG: decreased air entry over left lung field, no rales, no rhonchi . HEART: regular rate and rhythm, S1, S2 normal, no murmur, click, rub or gallop  ABDOMEN: soft, non-tender.  Bowel sounds normal. No masses,  no organomegaly  EXTREMITIES:  extremities normal, atraumatic, no cyanosis or edema  SKIN: Normal.  NEUROLOGIC: negative  PSYCHIATRIC: non focal    Lab/Data Review: All lab results for the last 24 hours reviewed. Assessment:     Principal Problem:    Acute renal failure (ARF) ([facility] (10/17/2017)    Active Problems:    Hyperkalemia (10/17/2017)      Malnourished ([address] (10/17/2017)      Failure to thrive (0-17) (10/17/2017)      Left lower lobe pneumonia ([facility] (10/17/2017)      Renal cancer, unspecified laterality ([facility] (10/17/2017)        Plan:     1. oliguric chiquis on CKD, pre-renal in etiology with Fena of 0.3% likely due to dehydration / hypotension/ chemotherapy side effect : associated with hyperkalemia:  Improving, hyperkalemia resolved, cr trending down- continue IVF-, monitor I/O- avoid nephrotoxic meds-  monitor chemistry daily- may consult renal if worsening    2. left lower lobe pneumonia with left bronchus obstruction ( metastasis ) : on o2 nasal cannula, saturating well- currently on vanco and cefepime- s/p PICC line 10/18/17-  await cultures    3. Hypotension, possible due to pneumonia: improving - on IV fluids and levophed low dose ( 1-2mcg) - continue to monitor    4. Anemia, multifactorial: possible due to advance renal cancer / CKD: no signs of overt bleeding- Hb 7.3 today-  Monitor H/H- Transfuse 1 PRBC     5. stage 4 metastatic renal cancer: hospice follow up - Oncology consulted      · DVT prophylaxis with heparin. · Opioid pain meds as needed.   · Failure to thrive: on remeron   · POC monitoring qachs, SSI.     Code Status: Full code-      Anticipated discharge: >2-3 MN    Signed By: Brian Gaines MD     October 19, 2017

## 2017-10-19 NOTE — PROGRESS NOTES
Problem: Pain  Goal: *Control of acute pain  Outcome: Progressing Towards Goal  Pt. Verbalized that she would like to go to the hospice house so they could give her pain meds and control air hunger more efficiently than if she were at home.

## 2017-10-20 LAB
ABO + RH BLD: NORMAL
ANION GAP SERPL CALC-SCNC: 12 MMOL/L (ref 7–16)
BACTERIA SPEC CULT: NORMAL
BLD PROD TYP BPU: NORMAL
BLOOD GROUP ANTIBODIES SERPL: NORMAL
BPU ID: NORMAL
BUN SERPL-MCNC: 69 MG/DL (ref 8–23)
CALCIUM SERPL-MCNC: 8.2 MG/DL (ref 8.3–10.4)
CHLORIDE SERPL-SCNC: 108 MMOL/L (ref 98–107)
CO2 SERPL-SCNC: 20 MMOL/L (ref 21–32)
CREAT SERPL-MCNC: 3.14 MG/DL (ref 0.6–1)
CROSSMATCH RESULT,%XM: NORMAL
ERYTHROCYTE [DISTWIDTH] IN BLOOD BY AUTOMATED COUNT: 20.5 % (ref 11.9–14.6)
GLUCOSE BLD STRIP.AUTO-MCNC: 71 MG/DL (ref 65–100)
GLUCOSE BLD STRIP.AUTO-MCNC: 72 MG/DL (ref 65–100)
GLUCOSE BLD STRIP.AUTO-MCNC: 76 MG/DL (ref 65–100)
GLUCOSE BLD STRIP.AUTO-MCNC: 86 MG/DL (ref 65–100)
GLUCOSE SERPL-MCNC: 74 MG/DL (ref 65–100)
HCT VFR BLD AUTO: 29.6 % (ref 35.8–46.3)
HGB BLD-MCNC: 9.1 G/DL (ref 11.7–15.4)
MCH RBC QN AUTO: 26.3 PG (ref 26.1–32.9)
MCHC RBC AUTO-ENTMCNC: 30.7 G/DL (ref 31.4–35)
MCV RBC AUTO: 85.5 FL (ref 79.6–97.8)
PLATELET # BLD AUTO: 116 K/UL (ref 150–450)
PMV BLD AUTO: 8.6 FL (ref 10.8–14.1)
POTASSIUM SERPL-SCNC: 4.8 MMOL/L (ref 3.5–5.1)
RBC # BLD AUTO: 3.46 M/UL (ref 4.05–5.25)
SERVICE CMNT-IMP: NORMAL
SODIUM SERPL-SCNC: 140 MMOL/L (ref 136–145)
SPECIMEN EXP DATE BLD: NORMAL
STATUS OF UNIT,%ST: NORMAL
UNIT DIVISION, %UDIV: 0
VANCOMYCIN SERPL-MCNC: 20.5 UG/ML
WBC # BLD AUTO: 6.3 K/UL (ref 4.3–11.1)

## 2017-10-20 PROCEDURE — 85027 COMPLETE CBC AUTOMATED: CPT | Performed by: HOSPITALIST

## 2017-10-20 PROCEDURE — 82962 GLUCOSE BLOOD TEST: CPT

## 2017-10-20 PROCEDURE — 74011250637 HC RX REV CODE- 250/637: Performed by: INTERNAL MEDICINE

## 2017-10-20 PROCEDURE — 74011000258 HC RX REV CODE- 258: Performed by: HOSPITALIST

## 2017-10-20 PROCEDURE — 77030020291 HC FLEXSEAL FMS BMS -C

## 2017-10-20 PROCEDURE — 97165 OT EVAL LOW COMPLEX 30 MIN: CPT

## 2017-10-20 PROCEDURE — 74011250637 HC RX REV CODE- 250/637: Performed by: HOSPITALIST

## 2017-10-20 PROCEDURE — 65270000029 HC RM PRIVATE

## 2017-10-20 PROCEDURE — 80048 BASIC METABOLIC PNL TOTAL CA: CPT | Performed by: HOSPITALIST

## 2017-10-20 PROCEDURE — 36592 COLLECT BLOOD FROM PICC: CPT

## 2017-10-20 PROCEDURE — 74011250636 HC RX REV CODE- 250/636: Performed by: HOSPITALIST

## 2017-10-20 PROCEDURE — 80202 ASSAY OF VANCOMYCIN: CPT | Performed by: HOSPITALIST

## 2017-10-20 RX ORDER — HYDROCORTISONE 25 MG/G
CREAM TOPICAL 4 TIMES DAILY
Status: DISCONTINUED | OUTPATIENT
Start: 2017-10-20 | End: 2017-10-22 | Stop reason: HOSPADM

## 2017-10-20 RX ADMIN — ONDANSETRON 4 MG: 2 INJECTION INTRAMUSCULAR; INTRAVENOUS at 20:06

## 2017-10-20 RX ADMIN — Medication 20 ML: at 13:51

## 2017-10-20 RX ADMIN — SODIUM CHLORIDE, PRESERVATIVE FREE 600 UNITS: 5 INJECTION INTRAVENOUS at 22:00

## 2017-10-20 RX ADMIN — Medication 20 ML: at 22:01

## 2017-10-20 RX ADMIN — HEPARIN SODIUM 5000 UNITS: 5000 INJECTION, SOLUTION INTRAVENOUS; SUBCUTANEOUS at 05:00

## 2017-10-20 RX ADMIN — MIRTAZAPINE 15 MG: 15 TABLET, FILM COATED ORAL at 22:01

## 2017-10-20 RX ADMIN — ONDANSETRON 4 MG: 2 INJECTION INTRAMUSCULAR; INTRAVENOUS at 01:18

## 2017-10-20 RX ADMIN — VANCOMYCIN HYDROCHLORIDE 1000 MG: 1 INJECTION, POWDER, LYOPHILIZED, FOR SOLUTION INTRAVENOUS at 13:53

## 2017-10-20 RX ADMIN — ONDANSETRON 4 MG: 2 INJECTION INTRAMUSCULAR; INTRAVENOUS at 05:00

## 2017-10-20 RX ADMIN — ONDANSETRON 4 MG: 2 INJECTION INTRAMUSCULAR; INTRAVENOUS at 17:00

## 2017-10-20 RX ADMIN — HEPARIN SODIUM 5000 UNITS: 5000 INJECTION, SOLUTION INTRAVENOUS; SUBCUTANEOUS at 20:06

## 2017-10-20 RX ADMIN — Medication 20 ML: at 06:00

## 2017-10-20 RX ADMIN — Medication 10 ML: at 22:02

## 2017-10-20 RX ADMIN — HEPARIN SODIUM 5000 UNITS: 5000 INJECTION, SOLUTION INTRAVENOUS; SUBCUTANEOUS at 13:00

## 2017-10-20 RX ADMIN — SODIUM CHLORIDE, PRESERVATIVE FREE 600 UNITS: 5 INJECTION INTRAVENOUS at 13:51

## 2017-10-20 RX ADMIN — ONDANSETRON 4 MG: 2 INJECTION INTRAMUSCULAR; INTRAVENOUS at 23:34

## 2017-10-20 RX ADMIN — ONDANSETRON 4 MG: 2 INJECTION INTRAMUSCULAR; INTRAVENOUS at 11:18

## 2017-10-20 RX ADMIN — SODIUM CHLORIDE, PRESERVATIVE FREE 600 UNITS: 5 INJECTION INTRAVENOUS at 06:04

## 2017-10-20 RX ADMIN — CEFEPIME 1 G: 1 INJECTION, POWDER, FOR SOLUTION INTRAMUSCULAR; INTRAVENOUS at 05:00

## 2017-10-20 RX ADMIN — HYDROCORTISONE 2.5%: 25 CREAM TOPICAL at 17:58

## 2017-10-20 NOTE — PROGRESS NOTES
Problem: Self Care Deficits Care Plan (Adult)  Goal: Interventions    OCCUPATIONAL THERAPY: Initial Assessment and AM 10/20/2017  INPATIENT: Hospital Day: 4  Payor: Romina Lockhart / Plan: [address] [place] [address] HMO / Product Type: Managed Care Medicare /      NAME/AGE/GENDER: Francis Duarte is a 74 y.o. female   PRIMARY DIAGNOSIS:  Acute renal failure (ARF) ([address]  Hyperkalemia  Renal cancer, unspecified laterality ([address]  Failure to thrive (0-17)  Malnourished ([address]  Left lower lobe pneumonia ([address] Acute renal failure (ARF) ([address] Acute renal failure (ARF) ([address]        ICD-10: Treatment Diagnosis:    · Generalized Muscle Weakness (M62.81)  · Other lack of cordination (R27.8)   Precautions/Allergies:     Review of patient's allergies indicates no known allergies. ASSESSMENT:     Ms. Alonzo Carbajal presents with above diagnosis. Pt lives with her  and reports that she has decided to go home with hospice care. Pt requested to sit on the edge of bed to change position. Pt educated on importance of energy conservation and getting assist for tasks when needed. Pt also educated on importance of position change at home. No further skilled OT at this time. This section established at most recent assessment   PROBLEM LIST (Impairments causing functional limitations):  1. Decreased Strength  2. Decreased ADL/Functional Activities  3. Decreased Transfer Abilities   INTERVENTIONS PLANNED: (Benefits and precautions of occupational therapy have been discussed with the patient.)  eval Only     TREATMENT PLAN: Frequency/Duration: Eval only         RECOMMENDED REHABILITATION/EQUIPMENT: (at time of discharge pending progress): Due to the probability of continued deficits (see above) this patient will not likely need continued skilled occupational therapy after discharge. Equipment:   [facility] with supply equipment needed.     None at this time              OCCUPATIONAL PROFILE AND HISTORY:   History of Present Injury/Illness (Reason for Referral):  See above   Past Medical History/Comorbidities:   Ms. Yobany Caldwell  has a past medical history of Diabetes ([address] Hypertension; and Thyroid disease. Ms. Yobany Caldwell  has a past surgical history that includes back surgery; parathyroidectomy; cystocele repair; rectocele repair; and hysterectomy. Social History/Living Environment:   Home Environment: Private residence  # Steps to Enter: 5  Rails to Enter: Yes  Hand Rails : Right  One/Two Story Residence: One story  Living Alone: No  Support Systems: Spouse/Significant Other/Partner  Patient Expects to be Discharged to[de-identified] Private residence  Current DME Used/Available at Home: Shower chair, Walker, rolling  Tub or Shower Type: Tub/Shower combination  Prior Level of Function/Work/Activity:  Pt reports requiring minimal assist for BADLs prior to admission. Number of Personal Factors/Comorbidities that affect the Plan of Care: Brief history (0):  LOW COMPLEXITY   ASSESSMENT OF OCCUPATIONAL PERFORMANCE[de-identified]   Activities of Daily Living:         Minimal assist  Basic ADLs (From Assessment) Complex ADLs (From Assessment)   Basic ADL  Feeding: Supervision  Oral Facial Hygiene/Grooming: Supervision  Bathing: Minimum assistance  Upper Body Dressing: Minimum assistance  Lower Body Dressing: Minimum assistance  Toileting: Minimum assistance     Grooming/Bathing/Dressing Activities of Daily Living     Cognitive Retraining  Safety/Judgement: Awareness of environment; Fall prevention                 Functional Transfers  Toilet Transfer : Minimum assistance     Bed/Mat Mobility  Supine to Sit: Minimum assistance  Sit to Supine: Minimum assistance  Sit to Stand: Minimum assistance       Most Recent Physical Functioning:   Gross Assessment:                  Posture:     Balance:    Bed Mobility:  Supine to Sit: Minimum assistance  Sit to Supine: Minimum assistance  Wheelchair Mobility:     Transfers:  Sit to Stand: Minimum assistance     ROM:          WFL           No data found. Mental Status  Neurologic State: Alert  Orientation Level: Oriented X4  Cognition: Appropriate decision making, Appropriate for age attention/concentration, Appropriate safety awareness, Follows commands  Perception: Appears intact  Perseveration: No perseveration noted  Safety/Judgement: Awareness of environment, Fall prevention                          Physical Skills Involved:  1. Range of Motion  2. Balance  3. Strength  4. Activity Tolerance Cognitive Skills Affected (resulting in the inability to perform in a timely and safe manner):  1. None Psychosocial Skills Affected:  1. None   Number of elements that affect the Plan of Care: 1-3:  LOW COMPLEXITY   CLINICAL DECISION MAKIN[address] [place] [address] AM-PAC 6 Clicks   Daily Activity Inpatient Short Form  How much help from another person does the patient currently need. .. Total A Lot A Little None   1. Putting on and taking off regular lower body clothing? [] 1   [] 2   [x] 3   [] 4   2. Bathing (including washing, rinsing, drying)? [] 1   [] 2   [x] 3   [] 4   3. Toileting, which includes using toilet, bedpan or urinal?   [] 1   [] 2   [x] 3   [] 4   4. Putting on and taking off regular upper body clothing? [] 1   [] 2   [x] 3   [] 4   5. Taking care of personal grooming such as brushing teeth? [] 1   [] 2   [x] 3   [] 4   6. Eating meals? [] 1   [] 2   [x] 3   [] 4   © , Trustees of [address] [place] [address], under license to [employer]. All rights reserved      Score:  Initial: 16 Most Recent: X (Date: -- )    Interpretation of Tool:  Represents activities that are increasingly more difficult (i.e. Bed mobility, Transfers, Gait). Score 24 23 22-20 19-15 14-10 9-7 6     Modifier CH CI CJ CK CL CM CN      ?  Self Care:     - CURRENT STATUS: CK - 40%-59% impaired, limited or restricted    - GOAL STATUS:  CK    - D/C STATUS:  CK - 40%-59% impaired, limited or restricted  Payor: Wendi Clipper / Plan: Whit Guzmán MEDICARE HMO / Product Type: Managed Care Medicare /      Medical Necessity:     Reason for Services/Other Comments:  · None. Use of outcome tool(s) and clinical judgement create a POC that gives a: LOW COMPLEXITY         TREATMENT:   (In addition to Assessment/Re-Assessment sessions the following treatments were rendered)     Pre-treatment Symptoms/Complaints:  None  Pain: Initial:   Pain Intensity 1: 0  Post Session:       Assessment/Reassessment only, no treatment provided today    Braces/Orthotics/Lines/Etc:   · IV  · O2 Device: Nasal cannula  Treatment/Session Assessment:    · Response to Treatment:  Tolerated well  · Interdisciplinary Collaboration:   o Physical Therapist  o Occupational Therapist  o Registered Nurse  · After treatment position/precautions:   o Supine in bed  o Bed/Chair-wheels locked  o Bed in low position  o Caregiver at bedside  o RN notified  o Side rails x 2   · Compliance with Program/Exercises: compliant all of the time. · Recommendations/Intent for next treatment session: \"Next visit will focus on advancements to more challenging activities and na\".   Total Treatment Duration:  OT Patient Time In/Time Out  Time In: 1140  Time Out: [facility], OT

## 2017-10-20 NOTE — PROGRESS NOTES
Pt was accepted to [facility] ( [phone] ). Plan is for pt to d/c home ( likely Sunday ) with [address] will need ambulance transport. Family request to meet with [facility] ( pt's daughter works for [facility] in [place] [facility] RN here meeting with family. Per MD Julianna Cruz ) pt is stable for d/c. Anticipate d/c will be on Sat.

## 2017-10-20 NOTE — PROGRESS NOTES
Problem: Nutrition Deficit  Goal: *Optimize nutritional status  Nutrition  Reason for assessment:  Consult received for General Nutrition Management and Supplements per Dr. Karel Sprague (10/18)  Assessment:   Diet order(s): Consistent CHO  Pt continues with poor po intake; requesting soft solids vs pureed. Pt has not been drinking the Glucerna Shake supplement; states it causes too much phlegm. Pt receiving Remeron. Anthropometrics:Height: 5' 7\" (170.2 cm),  Weight: 75 kg (165 lb 4.8 oz), Weight Source: Bed, Body mass index is 25.89 kg/(m^2). BMI class of normal range for Older American Woman. Pt currently at 62% stated usual body weight from 1 year ago. Weight today -bed source-82.6 kg. BMI 28.5.     Meets Criteria for Malnutrition in the context of Acute Illness/Injury      [] Severe Malnutrition, as evidenced by:                        [] Moderate to severe loss of muscle mass                        [x] Nutritional intake of <50% of energy intake compared to estimated energy needs for > 5 days                        [x] Weight loss of >2% in 1 week, >5% in 1 month, >7.5% in 3 months                        [] Moderate to severe edema                        [] Moderate to severe loss of subcutaneous fat          [] Non-Severe ( Moderate) Malnutrition, as evidenced by:                        [] Nutritional intake of <75% of energy intake compared to estimated energy needs for > 7 days                        [] Weight loss of 1-2% in 1 week, 5% in 1 month, 7.5% in 3 months                                     [] Mild loss of subcutaneous fat                        [] Mild loss of muscle mass                        [] Mild edema                               Macronutrient needs:  EER:  0428-2070 kcal /day (25-30 kcal/kg BW)  EPR:  61-74 grams protein/day (1-1.2 grams/kg IBW) GFR 16     Intake/Comparative Standards: Per RD meal rounds: pudding and coffee at breakfast, no lunch. 2 recorded intake of 50% and 10%.   Pt potentially meets < 20% estimated kcal needs and < 15% estimated protein needs.     Intervention:  1. Meals and snacks: Continue current diet. Assisted with supper menu selection. 2.  Nutrition Supplement Therapy: Discontinued Glucerna Shake supplement on all meal trays r/t non acceptance. 3.  Discharge nutrition plan: [facility] once discharged  4.   Coordination of Nutrition Care:  Interdisciplinary Rounds     Cosme Rodriguez, [address] [place] [address], [address] [place] [address], MPH  [phone]

## 2017-10-20 NOTE — PROGRESS NOTES
Pharmacokinetic Consult to Pharmacist    Cris Rosales is a 74 y.o. female being treated for sepsis with Vancomycin and Cefepime. Height: 5' 7\" (170.2 cm)  Weight: 82.6 kg (182 lb)  Lab Results   Component Value Date/Time    BUN 69 10/20/2017 04:08 AM    Creatinine 3.14 10/20/2017 04:08 AM    WBC 6.3 10/20/2017 04:08 AM    Procalcitonin 8.2 10/18/2017 06:24 AM    Lactic Acid (POC) 2.8 10/17/2017 04:37 PM      Estimated Creatinine Clearance: 19.7 mL/min (based on Cr of 3.14). CULTURES:  All Micro Results     Procedure Component Value Units Date/Time    CULTURE, BLOOD [[id number]] Collected:  10/17/17 1744    Order Status:  Completed Specimen:  Whole Blood from Blood Updated:  10/20/17 0755     Special Requests: --        RIGHT  Antecubital       Culture result: NO GROWTH 3 DAYS       CULTURE, BLOOD [[id number]] Collected:  10/17/17 1744    Order Status:  Completed Specimen:  Whole Blood from Blood Updated:  10/20/17 0755     Special Requests: --        LEFT  HAND       Culture result: NO GROWTH 3 DAYS       CULTURE, URINE [[id number]] Collected:  10/18/17 0029    Order Status:  Completed Specimen:  Urine from [place] Specimen Updated:  10/20/17 0718     Special Requests: NO SPECIAL REQUESTS        Culture result: NO GROWTH 2 DAYS               Lab Results   Component Value Date/Time    Vancomycin, random 20.5 10/20/2017 11:15 AM       Day 3 of vancomycin. Goal trough is 15 - 20. Will change to Vancomycin 1 gram today. May consider de-escalating therapy tomorrow. Will continue to follow patient.       Thank you,  Karson Alberts PharmD, BCPS

## 2017-10-20 NOTE — PROGRESS NOTES
TRANSFER - OUT REPORT:    Verbal report given to Arnel Braun RN(name) on Kenny Yan  being transferred to [facility](unit) for routine progression of care       Report consisted of patients Situation, Background, Assessment and   Recommendations(SBAR). Information from the following report(s) SBAR, Kardex, ED Summary, Intake/Output, MAR, Recent Results and Cardiac Rhythm SR 95 was reviewed with the receiving nurse. Lines:   PICC Double Lumen 10/18/17 Right;Brachial (Active)   Central Line Being Utilized Yes 10/19/2017  7:30 PM   Criteria for Appropriate Use Hemodynamically unstable, requiring monitoring lines, vasopressors, or volume resuscitation 10/19/2017  7:30 PM   Site Assessment Clean, dry, & intact 10/19/2017  7:30 PM   Phlebitis Assessment 0 10/19/2017  7:30 PM   Infiltration Assessment 0 10/19/2017  7:30 PM   Arm Circumference (cm) 27 cm 10/18/2017  2:43 PM   Date of Last Dressing Change 10/18/17 10/19/2017  7:30 PM   Dressing Status Clean, dry, & intact 10/19/2017  7:30 PM   External Catheter Length (cm) 0 centimeters 10/18/2017  2:43 PM   Dressing Type Disk with Chlorhexadine gluconate (CHG) 10/19/2017  7:30 PM   Hub Color/Line Status Red; Infusing 10/19/2017  7:30 PM   Positive Blood Return (Site #1) Yes 10/19/2017  7:30 PM   Hub Color/Line Status Purple;Flushed;Patent 10/19/2017  7:30 PM   Positive Blood Return (Site #2) Yes 10/19/2017  7:30 PM   Alcohol Cap Used No 10/19/2017  7:30 PM        Opportunity for questions and clarification was provided.       Patient transported with:   O2 @ 2 liters  Patient-specific medications from [facility]

## 2017-10-20 NOTE — PROGRESS NOTES
Follow-up visit by  to convey care and concern. Offered spiritual interventions, including affirmation of emotions, exploration of coping skills & support system, and prayer.     Kavya Beltran 68  Board Certified

## 2017-10-20 NOTE — PROGRESS NOTES
Progress Note    Patient: Timothy Sinclair MRN: [id number]  SSN: [id number]    YOB: 1950  Age: 74 y.o. Sex: female      Admit Date: 10/17/2017    LOS: 3 days     Subjective:   Patient is 74years old female with a recent diagnosis of stage 4 renal cancer, HTN, DM-2, hypothyroidism, chronic back pain brought in for weakness, fatigue, SOB since last couple of days. Found to have VICTORIANO (Cr 3.8 ) from baseline 1.5, potassium 6.2, lactic acid 2.8   On review of records, pt has renal cell carcinoma with sarcomatoid features, with bilateral lung masses and right adrenal mass. CXR showing left hilar mass with occlusion of left bronchus.     Today she was found lying on her bed, stated feeling better. Has decreased appetite.  at bedside, all questions answered. Agreed to pursue Hospice care at [facility] in light of her advanced cancer and non response to chemotherapy. Care manager involved in case and possible transfer to be over the weekend. Patient is off levophed drip. She is been moved to medicine floors. Denied pain, nausea, vomiting, abdominal pain, diarrhea. Objective:     Vitals:    10/20/17 1800 10/20/17 1830 10/20/17 1855 10/20/17 1910   BP: 92/47 (!) 82/50  108/65   Pulse: 90 96 92 97   Resp: 19 22 (!) 31    Temp:    98.2 °F (36.8 °C)   SpO2: 97% 95% 96% 100%   Weight:       Height:            Intake and Output:  Current Shift:    Last three shifts: 10/19 0701 - 10/20 1900  In: 2526.9 [P.O.:820; I.V.:1706.9]  Out: 800 [Urine:800]    Physical Exam:   GENERAL: alert, cooperative, no distress, appears stated age  EYE: negative  LYMPHATIC: Cervical, supraclavicular, and axillary nodes normal.   THROAT & NECK: normal and no erythema or exudates noted. LUNG: decreased air entry over left lung field, no rales, no rhonchi . HEART: regular rate and rhythm, S1, S2 normal, no murmur, click, rub or gallop  ABDOMEN: soft, non-tender, distended.  Bowel sounds normal. No masses,  no organomegaly  EXTREMITIES:  extremities normal, atraumatic, no cyanosis or edema  SKIN: Normal.  NEUROLOGIC: negative  PSYCHIATRIC: non focal    Lab/Data Review: All lab results for the last 24 hours reviewed. Assessment:     Principal Problem:    Acute renal failure (ARF) ([facility] (10/17/2017)    Active Problems:    Hyperkalemia (10/17/2017)      Malnourished ([address] (10/17/2017)      Failure to thrive (0-17) (10/17/2017)      Left lower lobe pneumonia ([facility] (10/17/2017)      Renal cancer, unspecified laterality ([facility] (10/17/2017)        Plan:     1. oliguric chiquis on CKD, pre-renal in etiology with Fena of 0.3% likely due to dehydration / hypotension/ chemotherapy side effect : associated with hyperkalemia:  Improving,  cr trending down- continue IVF-, monitor I/O- avoid nephrotoxic meds-  monitor chemistry daily-     2.  left lower lobe pneumonia with left bronchus obstruction ( metastasis ) : on o2 nasal cannula, saturating well- currently on vanco and cefepime- s/p PICC line 10/18/17-  await cultures    3. Hypotension, possible due to pneumonia: improving - on IV fluids - Off levophed gtt - continue to monitor    4. Anemia, multifactorial: possible due to advance renal cancer / CKD: no signs of overt bleeding- s/p 1 PRBC- Hb 9 today-  Monitor H/H-      5. stage 4 metastatic renal cancer: agreed to pursue hospice at [facility] - possible transfer during the weekend      · DVT prophylaxis with heparin. · Opioid pain meds as needed.   · Failure to thrive: on remeron   · POC monitoring qachs, SSI.     Code Status: Full code-      Anticipated discharge: >2-3 MN    Signed By: Yvonne Gaitan MD     October 20, 2017

## 2017-10-21 LAB
ANION GAP SERPL CALC-SCNC: 12 MMOL/L (ref 7–16)
BUN SERPL-MCNC: 65 MG/DL (ref 8–23)
CALCIUM SERPL-MCNC: 7.8 MG/DL (ref 8.3–10.4)
CHLORIDE SERPL-SCNC: 110 MMOL/L (ref 98–107)
CO2 SERPL-SCNC: 18 MMOL/L (ref 21–32)
CREAT SERPL-MCNC: 3.32 MG/DL (ref 0.6–1)
GLUCOSE BLD STRIP.AUTO-MCNC: 63 MG/DL (ref 65–100)
GLUCOSE BLD STRIP.AUTO-MCNC: 72 MG/DL (ref 65–100)
GLUCOSE BLD STRIP.AUTO-MCNC: 78 MG/DL (ref 65–100)
GLUCOSE BLD STRIP.AUTO-MCNC: 78 MG/DL (ref 65–100)
GLUCOSE BLD STRIP.AUTO-MCNC: 81 MG/DL (ref 65–100)
GLUCOSE SERPL-MCNC: 65 MG/DL (ref 65–100)
POTASSIUM SERPL-SCNC: 4.4 MMOL/L (ref 3.5–5.1)
SODIUM SERPL-SCNC: 140 MMOL/L (ref 136–145)

## 2017-10-21 PROCEDURE — 36592 COLLECT BLOOD FROM PICC: CPT

## 2017-10-21 PROCEDURE — 65270000029 HC RM PRIVATE

## 2017-10-21 PROCEDURE — 74011250636 HC RX REV CODE- 250/636: Performed by: HOSPITALIST

## 2017-10-21 PROCEDURE — 82962 GLUCOSE BLOOD TEST: CPT

## 2017-10-21 PROCEDURE — 77030019605

## 2017-10-21 PROCEDURE — 80048 BASIC METABOLIC PNL TOTAL CA: CPT | Performed by: INTERNAL MEDICINE

## 2017-10-21 PROCEDURE — 74011000258 HC RX REV CODE- 258: Performed by: HOSPITALIST

## 2017-10-21 PROCEDURE — 74011250637 HC RX REV CODE- 250/637: Performed by: HOSPITALIST

## 2017-10-21 PROCEDURE — 74011250637 HC RX REV CODE- 250/637: Performed by: INTERNAL MEDICINE

## 2017-10-21 RX ORDER — INSULIN LISPRO 100 [IU]/ML
INJECTION, SOLUTION INTRAVENOUS; SUBCUTANEOUS
Qty: 1 VIAL | Refills: 2 | Status: SHIPPED | OUTPATIENT
Start: 2017-10-21

## 2017-10-21 RX ORDER — MAG HYDROX/ALUMINUM HYD/SIMETH 200-200-20
30 SUSPENSION, ORAL (FINAL DOSE FORM) ORAL
Qty: 1 BOTTLE | Refills: 0 | Status: SHIPPED | OUTPATIENT
Start: 2017-10-21

## 2017-10-21 RX ORDER — FENTANYL 75 UG/H
1 PATCH TRANSDERMAL
Qty: 10 PATCH | Refills: 0 | Status: SHIPPED | OUTPATIENT
Start: 2017-10-21

## 2017-10-21 RX ORDER — HYDROCORTISONE 10 MG/G
CREAM TOPICAL AS NEEDED
Qty: 30 G | Refills: 0 | Status: SHIPPED | OUTPATIENT
Start: 2017-10-21

## 2017-10-21 RX ORDER — ACETAMINOPHEN 325 MG/1
650 TABLET ORAL
Qty: 30 TAB | Refills: 0 | Status: SHIPPED | OUTPATIENT
Start: 2017-10-21

## 2017-10-21 RX ORDER — LEVOFLOXACIN 750 MG/1
750 TABLET ORAL
Qty: 3 TAB | Refills: 0 | Status: SHIPPED | OUTPATIENT
Start: 2017-10-23

## 2017-10-21 RX ORDER — LEVOFLOXACIN 250 MG/1
750 TABLET ORAL
Status: DISCONTINUED | OUTPATIENT
Start: 2017-10-21 | End: 2017-10-22 | Stop reason: HOSPADM

## 2017-10-21 RX ORDER — ONDANSETRON 4 MG/1
4 TABLET, ORALLY DISINTEGRATING ORAL
Qty: 20 TAB | Refills: 0 | Status: SHIPPED | OUTPATIENT
Start: 2017-10-21

## 2017-10-21 RX ORDER — HYDROCODONE BITARTRATE AND ACETAMINOPHEN 10; 325 MG/1; MG/1
1 TABLET ORAL
Qty: 30 TAB | Refills: 0 | Status: SHIPPED | OUTPATIENT
Start: 2017-10-21

## 2017-10-21 RX ADMIN — ALUMINUM HYDROXIDE, MAGNESIUM HYDROXIDE, AND SIMETHICONE 30 ML: 200; 200; 20 SUSPENSION ORAL at 18:37

## 2017-10-21 RX ADMIN — Medication: at 20:47

## 2017-10-21 RX ADMIN — HEPARIN SODIUM 5000 UNITS: 5000 INJECTION, SOLUTION INTRAVENOUS; SUBCUTANEOUS at 20:44

## 2017-10-21 RX ADMIN — Medication 10 ML: at 20:46

## 2017-10-21 RX ADMIN — HYDROCORTISONE 2.5%: 25 CREAM TOPICAL at 09:00

## 2017-10-21 RX ADMIN — CEFEPIME 1 G: 1 INJECTION, POWDER, FOR SOLUTION INTRAMUSCULAR; INTRAVENOUS at 05:13

## 2017-10-21 RX ADMIN — ONDANSETRON 4 MG: 2 INJECTION INTRAMUSCULAR; INTRAVENOUS at 16:01

## 2017-10-21 RX ADMIN — Medication 20 ML: at 16:18

## 2017-10-21 RX ADMIN — MIRTAZAPINE 15 MG: 15 TABLET, FILM COATED ORAL at 20:44

## 2017-10-21 RX ADMIN — Medication 20 ML: at 20:45

## 2017-10-21 RX ADMIN — Medication 5 ML: at 23:50

## 2017-10-21 RX ADMIN — HYDROCORTISONE 2.5%: 25 CREAM TOPICAL at 18:26

## 2017-10-21 RX ADMIN — HYDROCORTISONE 2.5%: 25 CREAM TOPICAL at 13:00

## 2017-10-21 RX ADMIN — HEPARIN SODIUM 5000 UNITS: 5000 INJECTION, SOLUTION INTRAVENOUS; SUBCUTANEOUS at 11:29

## 2017-10-21 RX ADMIN — LEVOFLOXACIN 750 MG: 250 TABLET, FILM COATED ORAL at 11:28

## 2017-10-21 RX ADMIN — ONDANSETRON 4 MG: 2 INJECTION INTRAMUSCULAR; INTRAVENOUS at 20:44

## 2017-10-21 RX ADMIN — HYDROCORTISONE 2.5%: 25 CREAM TOPICAL at 20:48

## 2017-10-21 RX ADMIN — ONDANSETRON 4 MG: 2 INJECTION INTRAMUSCULAR; INTRAVENOUS at 05:34

## 2017-10-21 RX ADMIN — SODIUM CHLORIDE, PRESERVATIVE FREE 600 UNITS: 5 INJECTION INTRAVENOUS at 20:45

## 2017-10-21 RX ADMIN — SODIUM CHLORIDE, PRESERVATIVE FREE 600 UNITS: 5 INJECTION INTRAVENOUS at 16:12

## 2017-10-21 RX ADMIN — SODIUM CHLORIDE, PRESERVATIVE FREE 600 UNITS: 5 INJECTION INTRAVENOUS at 05:33

## 2017-10-21 RX ADMIN — Medication: at 18:26

## 2017-10-21 RX ADMIN — HEPARIN SODIUM 5000 UNITS: 5000 INJECTION, SOLUTION INTRAVENOUS; SUBCUTANEOUS at 05:30

## 2017-10-21 RX ADMIN — ONDANSETRON 4 MG: 2 INJECTION INTRAMUSCULAR; INTRAVENOUS at 11:28

## 2017-10-21 RX ADMIN — ONDANSETRON 4 MG: 2 INJECTION INTRAMUSCULAR; INTRAVENOUS at 23:50

## 2017-10-21 RX ADMIN — ONDANSETRON 4 MG: 2 INJECTION INTRAMUSCULAR; INTRAVENOUS at 08:16

## 2017-10-21 NOTE — DISCHARGE SUMMARY
Discharge Summary     Patient: Rhonda Oliver MRN: [id number]  SSN: [id number]    YOB: 1950  Age: 74 y.o. Sex: female       Admit Date: 10/17/2017    Discharge Date: 10/22/17      Admission Diagnoses: Acute renal failure (ARF) ([facility]  Hyperkalemia  Renal cancer, unspecified laterality ([facility]  Failure to thrive (0-17)  Malnourished ([facility]  Left lower lobe pneumonia ([facility]    Discharge Diagnoses:   Problem List as of 10/21/2017  Date Reviewed: 10/6/2017          Codes Class Noted - Resolved    Hyperkalemia ICD-10-CM: E87.5  ICD-9-CM: 276.7  10/17/2017 - Present        Malnourished ([facility] ICD-10-CM: E46  ICD-9-CM: 263.9  10/17/2017 - Present        Failure to thrive (0-17) ICD-10-CM: R62.51  ICD-9-CM: 783.41  10/17/2017 - Present        * (Principal)Acute renal failure (ARF) ([facility] ICD-10-CM: N17.9  ICD-9-CM: 584.9  10/17/2017 - Present        Left lower lobe pneumonia ([facility] ICD-10-CM: J18.1  ICD-9-CM: 486  10/17/2017 - Present        Renal cancer, unspecified laterality ([facility] ICD-10-CM: C64.9  ICD-9-CM: 189.0  10/17/2017 - Present               Discharge Condition: [place] Course:   Patient is 74years old female with a recent diagnosis of stage 4 renal cancer- sarcomatoid features, lung and renal masses, HTN, DM-2, hypothyroidism, chronic back pain admitted on 10/17/17 secondary to generalized weakness, hypotension on levophed gtt and non-oliguric VICTORIANO, possible secondary to ANT/dehydration and medication side effects ( chemotherapy ). Her pertinent labs upon arrival were Cr 3.8  from baseline 1.5, potassium 6.2, lactic acid 2.8. CXR showed left sided likely bronchial mass, possible with underlying pneumonia, reason to start iv antibiotics ( levoquin, eventually overlapped to oral therapy ). She was placed under ICU care due to her hypotension and need of vasopressors. Her clinical status was stable and oncology was consulted in terms of goals of care.  Hospice meeting held with patient and her  and both of them agreed to pursue home hospice since she is not responding to chemotherapy and is experiencing a lot of side effects secondary to that. Patients hospital course improved and was send to regular medicine floors to continue her care. So far patient remains afebrile, in no pain, serrano cath in place to monitor her urine output. Care manager involved and family would like to work with [facility]. Arrangements are been made to transfer patient to home hospice. Prior discharge remove PICC line. Patient can be discharge on serrano catheter, since is indicated as a palliative care measure in terminally ill patients. Physical exam:  VS stable  Alert, oriented x 3, in no distress  Heent: peerla, normal conjunctivas  Neck: supple  Cardiac: normal s1, s2, no murmurs, no gallops  Lungs: decreased air entry over left lung base, no rales, no wheezing  Abdomen: mild distended, soft, normal bowel sounds, no rebound  Extremities: no edema  Vascular: pulses present  Skin: no skin rash  Rectal area: erosion over her perineal area- anusol cream noted  Serrano cath in place       Consults: HOSPICE AND ONCOLOGY     Significant Diagnostic Studies: SEE DC NOTE     Disposition: [place]     Discharge Medications:   Current Discharge Medication List      START taking these medications    Details   acetaminophen (TYLENOL) 325 mg tablet Take 2 Tabs by mouth every six (6) hours as needed. Qty: 30 Tab, Refills: 0      alum-mag hydroxide-simeth (MYLANTA) 200-200-20 mg/5 mL susp Take 30 mL by mouth three (3) times daily (with meals). Qty: 1 Bottle, Refills: 0      hydrocortisone (PROCTO-TRUNG) 1 % rectal cream Insert  into rectum as needed for Hemorrhoids.   Qty: 30 g, Refills: 0      insulin lispro (HUMALOG) 100 unit/mL injection As per sliding scale protocol:  < 150 give 0 units   150- 199 mg/dl: give 2 units   200 - 249 mg/dl : give 4 units  250 - 299 mg/dl: give 6 units 300- 349 mg: give 8 units  > 350 mg/dl 10 units  Qty: 1 Vial, Refills: 2      levoFLOXacin (LEVAQUIN) 750 mg tablet Take 1 Tab by mouth every fourty-eight (48) hours. Qty: 3 Tab, Refills: 0      ondansetron (ZOFRAN ODT) 4 mg disintegrating tablet Take 1 Tab by mouth every eight (8) hours as needed for Nausea. Qty: 20 Tab, Refills: 0      HYDROcodone-acetaminophen (NORCO)  mg tablet Take 1 Tab by mouth every six (6) hours as needed for Pain. Max Daily Amount: 4 Tabs. Qty: 30 Tab, Refills: 0         CONTINUE these medications which have CHANGED    Details   fentaNYL (DURAGESIC) 75 mcg/hr 1 Patch by TransDERmal route every seventy-two (72) hours. Max Daily Amount: 1 Patch. Qty: 10 Patch, Refills: 0         STOP taking these medications       ondansetron hcl (ZOFRAN) 8 mg tablet Comments:   Reason for Stopping:         HYDROmorphone (DILAUDID) 4 mg tablet Comments:   Reason for Stopping:         losartan-hydroCHLOROthiazide (HYZAAR) 50-12.5 mg per tablet Comments:   Reason for Stopping:         metFORMIN (GLUCOPHAGE) 1,000 mg tablet Comments:   Reason for Stopping:         mirtazapine (REMERON) 15 mg tablet Comments:   Reason for Stopping:               Activity: Activity as tolerated and Bedrest  Diet: Renal Diet  Wound Care: None needed    No orders of the defined types were placed in this encounter.       Signed By: Yvonne Gaitan MD     October 21, 2017

## 2017-10-21 NOTE — PROGRESS NOTES
Patient reposition on her left side but could not tolerate repositioned to her back. No complaints of pain but did request a sweet tea. Called MD and requested the patient have a regular diet, new order received. Notified DOC of the change in diet and requested a sweet tea be delivered.

## 2017-10-21 NOTE — PROGRESS NOTES
Sqbs now 72- pt has hot chocolate to drink. Mylanta 30 ml given po for heart burn. Pt resting in bed without distress noted.   Denies pain

## 2017-10-21 NOTE — PROGRESS NOTES
Alert and orient resting in bed. incontinent of stool with noted excoriation of sacral area with labia swelling. Noted a tagged area with bleeding around sacral area. Dr Rickey Edwards here and shown to her. Protective barrier cream applied to area. Patient has not eaten any breakfast states she is not hungry.

## 2017-10-21 NOTE — PROGRESS NOTES
Spoke with patient and spouse  OK for Sunday discharge. Spoke with Constance with [facility] ([phone]). She will contact spouse regarding delivery of hospital bed. Anticipate discharge at 1:00 on Sunday. Denise scheduled to be at patients house at 2:00. Spouses cell  [phone]. Ambulance was arranged for transport home.

## 2017-10-21 NOTE — PROGRESS NOTES
Shift Assessment - Pt is alert and oriented x4. No complaint of pain or discomfort at this time. Pt has a Orr catheter and is voiding urine that is clear, yellow and malodorous. Patient has a PICC in the right upper arm which flushes well. Diabetic diet. Poor appetite. Complaint of discomfort with rectal tube. Family present at the moment. Currently resting in a low, locked bed with call light within reach.

## 2017-10-21 NOTE — PROGRESS NOTES
Pt resting in bed. Denies pain but states she feels weak. SQBS-63. Pt wants to drink ginger ale. Ginger ale at bedside and pt drinking it. Pt remains alert.

## 2017-10-21 NOTE — PROGRESS NOTES
Progress Note    Patient: Lidia Meehanr MRN: [id number]  SSN: [id number]    YOB: 1950  Age: 74 y.o. Sex: female      Admit Date: 10/17/2017    LOS: 4 days     Subjective:   Patient examined at bedside. Stated feeling stable. Antibiotics changed to oral levoquin to continue pneumonia treatment. Care manager contacted today and patient will be discharge tomorrow to home hospice. Arrangements made. Denied pain, nausea, vomiting, abdominal pain, diarrhea. Objective:     Vitals:    10/21/17 0305 10/21/17 0541 10/21/17 0720 10/21/17 1145   BP: 95/62  99/59 94/59   Pulse: 94  90 88   Resp: 20 18 18   Temp: 96.8 °F (36 °C)  96.3 °F (35.7 °C) 97.9 °F (36.6 °C)   SpO2: 97%  99% 100%   Weight:  83.1 kg (183 lb 1.6 oz)     Height:            Intake and Output:  Current Shift:    Last three shifts: 10/19 1901 - 10/21 0700  In: 1318.9 [P.O.:460; I.V.:858.9]  Out: 625 [Urine:625]    Physical Exam:   GENERAL: alert, cooperative, no distress, appears stated age  EYE: negative  LYMPHATIC: Cervical, supraclavicular, and axillary nodes normal.   THROAT & NECK: normal and no erythema or exudates noted. LUNG: decreased air entry over left lung field, no rales, no rhonchi . HEART: regular rate and rhythm, S1, S2 normal, no murmur, click, rub or gallop  ABDOMEN: soft, non-tender, distended. Bowel sounds normal. No masses,  no organomegaly  EXTREMITIES:  extremities normal, atraumatic, no cyanosis or edema  SKIN: Normal.  NEUROLOGIC: negative  PSYCHIATRIC: non focal    Lab/Data Review: All lab results for the last 24 hours reviewed.          Assessment:     Principal Problem:    Acute renal failure (ARF) ([address] (10/17/2017)    Active Problems:    Hyperkalemia (10/17/2017)      Malnourished ([address] (10/17/2017)      Failure to thrive (0-17) (10/17/2017)      Left lower lobe pneumonia ([facility] (10/17/2017)      Renal cancer, unspecified laterality ([facility] (10/17/2017)        Plan:     1. oliguric chiquis on CKD, pre-renal in etiology with Fena of 0.3% likely due to ATN: dehydration / hypotension/ chemotherapy side effect : associated with hyperkalemia:  Stable - continue IVF-, monitor I/O- avoid nephrotoxic meds-  monitor chemistry daily-     2.  left lower lobe pneumonia with left bronchus obstruction ( metastasis ) : on o2 nasal cannula, saturating well- discontinue vanco and cefepime and start on oral levoquin /  await cultures    3. Hypotension, possible due to pneumonia: resolved      4. Anemia, multifactorial: possible due to advance renal cancer / CKD: no signs of overt bleeding- s/p 1 PRBC- Hb 9 today-  Monitor H/H-      5. stage 4 metastatic renal cancer: agreed to pursue hospice at [facility] - transfer to home hospice tomorrow Sunday at 1: 00pm      · DVT prophylaxis with heparin. · Opioid pain meds as needed.   · Failure to thrive: on remeron   · POC monitoring qachs, SSI.     Code Status: Full code-      Anticipated discharge: >2-3 MN    Signed By: Cristian Monroy MD     October 21, 2017

## 2017-10-21 NOTE — PROGRESS NOTES
[facility] hospice persons here to see patient. Patient states she does not want to do any more treatment for her CA. She is a candidate for home hospice.

## 2017-10-22 VITALS
OXYGEN SATURATION: 99 % | DIASTOLIC BLOOD PRESSURE: 51 MMHG | TEMPERATURE: 97.5 F | SYSTOLIC BLOOD PRESSURE: 93 MMHG | BODY MASS INDEX: 28.74 KG/M2 | HEIGHT: 67 IN | HEART RATE: 90 BPM | RESPIRATION RATE: 16 BRPM | WEIGHT: 183.1 LBS

## 2017-10-22 LAB
BACTERIA SPEC CULT: NORMAL
BACTERIA SPEC CULT: NORMAL
GLUCOSE BLD STRIP.AUTO-MCNC: 72 MG/DL (ref 65–100)
SERVICE CMNT-IMP: NORMAL
SERVICE CMNT-IMP: NORMAL

## 2017-10-22 PROCEDURE — 74011250637 HC RX REV CODE- 250/637: Performed by: HOSPITALIST

## 2017-10-22 PROCEDURE — 74011250636 HC RX REV CODE- 250/636: Performed by: HOSPITALIST

## 2017-10-22 PROCEDURE — 82962 GLUCOSE BLOOD TEST: CPT

## 2017-10-22 RX ADMIN — HYDROCORTISONE 2.5%: 25 CREAM TOPICAL at 09:00

## 2017-10-22 RX ADMIN — ONDANSETRON 4 MG: 2 INJECTION INTRAMUSCULAR; INTRAVENOUS at 05:24

## 2017-10-22 RX ADMIN — HYDROCODONE BITARTRATE AND ACETAMINOPHEN 1 TABLET: 7.5; 325 TABLET ORAL at 12:05

## 2017-10-22 RX ADMIN — Medication 20 ML: at 05:24

## 2017-10-22 RX ADMIN — HEPARIN SODIUM 5000 UNITS: 5000 INJECTION, SOLUTION INTRAVENOUS; SUBCUTANEOUS at 05:23

## 2017-10-22 RX ADMIN — SODIUM CHLORIDE, PRESERVATIVE FREE 600 UNITS: 5 INJECTION INTRAVENOUS at 05:24

## 2017-10-22 NOTE — PROGRESS NOTES
Discharge paperwork given to patient along with prescriptions. Patient verbalized understanding.  Being discharged home with hospice by ambulance transport

## 2017-10-27 ENCOUNTER — PATIENT OUTREACH (OUTPATIENT)
Dept: CASE MANAGEMENT | Age: 67
End: 2017-10-27